# Patient Record
Sex: MALE | Race: OTHER | HISPANIC OR LATINO
[De-identification: names, ages, dates, MRNs, and addresses within clinical notes are randomized per-mention and may not be internally consistent; named-entity substitution may affect disease eponyms.]

---

## 2017-10-13 ENCOUNTER — APPOINTMENT (OUTPATIENT)
Dept: UROLOGY | Facility: CLINIC | Age: 57
End: 2017-10-13
Payer: COMMERCIAL

## 2017-10-13 VITALS
DIASTOLIC BLOOD PRESSURE: 95 MMHG | HEART RATE: 60 BPM | SYSTOLIC BLOOD PRESSURE: 148 MMHG | TEMPERATURE: 98.5 F | HEIGHT: 73 IN | WEIGHT: 205 LBS | BODY MASS INDEX: 27.17 KG/M2

## 2017-10-13 DIAGNOSIS — Z87.19 PERSONAL HISTORY OF OTHER DISEASES OF THE DIGESTIVE SYSTEM: ICD-10-CM

## 2017-10-13 DIAGNOSIS — Z85.528 PERSONAL HISTORY OF OTHER MALIGNANT NEOPLASM OF KIDNEY: ICD-10-CM

## 2017-10-13 DIAGNOSIS — Z80.42 FAMILY HISTORY OF MALIGNANT NEOPLASM OF PROSTATE: ICD-10-CM

## 2017-10-13 DIAGNOSIS — Z80.0 FAMILY HISTORY OF MALIGNANT NEOPLASM OF DIGESTIVE ORGANS: ICD-10-CM

## 2017-10-13 PROCEDURE — 99204 OFFICE O/P NEW MOD 45 MIN: CPT

## 2017-10-13 RX ORDER — FLUTICASONE PROPIONATE 50 UG/1
SPRAY, METERED NASAL
Refills: 0 | Status: ACTIVE | COMMUNITY

## 2017-10-16 LAB
ANION GAP SERPL CALC-SCNC: 16 MMOL/L
BUN SERPL-MCNC: 16 MG/DL
CALCIUM SERPL-MCNC: 9.6 MG/DL
CHLORIDE SERPL-SCNC: 102 MMOL/L
CO2 SERPL-SCNC: 25 MMOL/L
CREAT SERPL-MCNC: 1.14 MG/DL
GLUCOSE SERPL-MCNC: 97 MG/DL
POTASSIUM SERPL-SCNC: 4.5 MMOL/L
PSA SERPL-MCNC: 1.35 NG/ML
SODIUM SERPL-SCNC: 143 MMOL/L

## 2017-10-22 ENCOUNTER — FORM ENCOUNTER (OUTPATIENT)
Age: 57
End: 2017-10-22

## 2017-10-23 ENCOUNTER — OUTPATIENT (OUTPATIENT)
Dept: OUTPATIENT SERVICES | Facility: HOSPITAL | Age: 57
LOS: 1 days | End: 2017-10-23
Payer: COMMERCIAL

## 2017-10-23 ENCOUNTER — APPOINTMENT (OUTPATIENT)
Dept: RADIOLOGY | Facility: IMAGING CENTER | Age: 57
End: 2017-10-23
Payer: COMMERCIAL

## 2017-10-23 ENCOUNTER — APPOINTMENT (OUTPATIENT)
Dept: CT IMAGING | Facility: IMAGING CENTER | Age: 57
End: 2017-10-23
Payer: COMMERCIAL

## 2017-10-23 DIAGNOSIS — Z85.528 PERSONAL HISTORY OF OTHER MALIGNANT NEOPLASM OF KIDNEY: ICD-10-CM

## 2017-10-23 PROCEDURE — 74177 CT ABD & PELVIS W/CONTRAST: CPT | Mod: 26

## 2017-10-23 PROCEDURE — 71020: CPT | Mod: 26

## 2017-10-23 PROCEDURE — 74177 CT ABD & PELVIS W/CONTRAST: CPT

## 2017-10-23 PROCEDURE — 71046 X-RAY EXAM CHEST 2 VIEWS: CPT

## 2017-10-23 PROCEDURE — 82565 ASSAY OF CREATININE: CPT

## 2017-10-31 DIAGNOSIS — Z85.528 PERSONAL HISTORY OF OTHER MALIGNANT NEOPLASM OF KIDNEY: ICD-10-CM

## 2017-10-31 DIAGNOSIS — N20.0 CALCULUS OF KIDNEY: ICD-10-CM

## 2017-11-08 DIAGNOSIS — N20.0 CALCULUS OF KIDNEY: ICD-10-CM

## 2018-06-19 ENCOUNTER — FORM ENCOUNTER (OUTPATIENT)
Age: 58
End: 2018-06-19

## 2018-06-20 ENCOUNTER — APPOINTMENT (OUTPATIENT)
Dept: ULTRASOUND IMAGING | Facility: IMAGING CENTER | Age: 58
End: 2018-06-20
Payer: COMMERCIAL

## 2018-06-20 ENCOUNTER — OUTPATIENT (OUTPATIENT)
Dept: OUTPATIENT SERVICES | Facility: HOSPITAL | Age: 58
LOS: 1 days | End: 2018-06-20
Payer: COMMERCIAL

## 2018-06-20 DIAGNOSIS — Z85.528 PERSONAL HISTORY OF OTHER MALIGNANT NEOPLASM OF KIDNEY: ICD-10-CM

## 2018-06-20 PROCEDURE — 76770 US EXAM ABDO BACK WALL COMP: CPT

## 2018-06-20 PROCEDURE — 76770 US EXAM ABDO BACK WALL COMP: CPT | Mod: 26

## 2018-07-25 PROBLEM — Z80.0 FAMILY HISTORY OF COLON CANCER: Status: ACTIVE | Noted: 2017-10-13

## 2018-09-18 ENCOUNTER — APPOINTMENT (OUTPATIENT)
Dept: UROLOGY | Facility: CLINIC | Age: 58
End: 2018-09-18
Payer: COMMERCIAL

## 2018-09-18 DIAGNOSIS — Z12.5 ENCOUNTER FOR SCREENING FOR MALIGNANT NEOPLASM OF PROSTATE: ICD-10-CM

## 2018-09-18 PROCEDURE — 99214 OFFICE O/P EST MOD 30 MIN: CPT

## 2018-09-20 ENCOUNTER — OTHER (OUTPATIENT)
Age: 58
End: 2018-09-20

## 2018-09-21 LAB — PSA SERPL-MCNC: 1.54 NG/ML

## 2018-10-08 ENCOUNTER — FORM ENCOUNTER (OUTPATIENT)
Age: 58
End: 2018-10-08

## 2018-10-09 ENCOUNTER — OUTPATIENT (OUTPATIENT)
Dept: OUTPATIENT SERVICES | Facility: HOSPITAL | Age: 58
LOS: 1 days | End: 2018-10-09
Payer: COMMERCIAL

## 2018-10-09 ENCOUNTER — APPOINTMENT (OUTPATIENT)
Dept: MRI IMAGING | Facility: IMAGING CENTER | Age: 58
End: 2018-10-09
Payer: COMMERCIAL

## 2018-10-09 DIAGNOSIS — Z12.5 ENCOUNTER FOR SCREENING FOR MALIGNANT NEOPLASM OF PROSTATE: ICD-10-CM

## 2018-10-09 PROCEDURE — 82565 ASSAY OF CREATININE: CPT

## 2018-10-09 PROCEDURE — A9585: CPT

## 2018-10-09 PROCEDURE — 74183 MRI ABD W/O CNTR FLWD CNTR: CPT | Mod: 26

## 2018-10-09 PROCEDURE — 74183 MRI ABD W/O CNTR FLWD CNTR: CPT

## 2019-01-15 ENCOUNTER — OUTPATIENT (OUTPATIENT)
Dept: OUTPATIENT SERVICES | Facility: HOSPITAL | Age: 59
LOS: 1 days | End: 2019-01-15
Payer: SELF-PAY

## 2019-01-15 ENCOUNTER — APPOINTMENT (OUTPATIENT)
Dept: UROLOGY | Facility: CLINIC | Age: 59
End: 2019-01-15

## 2019-01-15 ENCOUNTER — APPOINTMENT (OUTPATIENT)
Dept: UROLOGY | Facility: CLINIC | Age: 59
End: 2019-01-15
Payer: COMMERCIAL

## 2019-01-15 DIAGNOSIS — R35.0 FREQUENCY OF MICTURITION: ICD-10-CM

## 2019-01-15 PROCEDURE — 76775 US EXAM ABDO BACK WALL LIM: CPT

## 2019-01-15 PROCEDURE — 76775 US EXAM ABDO BACK WALL LIM: CPT | Mod: 26

## 2019-01-16 DIAGNOSIS — N20.0 CALCULUS OF KIDNEY: ICD-10-CM

## 2019-01-16 DIAGNOSIS — Z85.528 PERSONAL HISTORY OF OTHER MALIGNANT NEOPLASM OF KIDNEY: ICD-10-CM

## 2019-07-02 ENCOUNTER — APPOINTMENT (OUTPATIENT)
Dept: UROLOGY | Facility: CLINIC | Age: 59
End: 2019-07-02

## 2019-07-02 ENCOUNTER — APPOINTMENT (OUTPATIENT)
Dept: UROLOGY | Facility: CLINIC | Age: 59
End: 2019-07-02
Payer: COMMERCIAL

## 2019-07-02 VITALS — TEMPERATURE: 98.2 F | SYSTOLIC BLOOD PRESSURE: 146 MMHG | DIASTOLIC BLOOD PRESSURE: 93 MMHG | HEART RATE: 54 BPM

## 2019-07-02 PROCEDURE — 99214 OFFICE O/P EST MOD 30 MIN: CPT

## 2019-07-02 NOTE — ASSESSMENT
[FreeTextEntry1] : Stone:  stable; observe; discussed AS vs. treatment/procedure\par Adrenal: MRI \par RCC history: MRI \par CAP screening/family history: patient refused LOKESH today; PSA today \par Follow up in one year

## 2019-07-02 NOTE — HISTORY OF PRESENT ILLNESS
[FreeTextEntry1] : CC: History of RCC, nephrolithiasis, adrenal lesion, PSA screening with positive family history \par \par Patient underwent partial nephrectomy for RCC. \par Low grade RCC with papillary features, 2008, G2, T1a 2.5 cm with negative margins. \par Patient with adrenal incidentaloma last imaged one year ago stable appearing. \par Positive family history of prostate cancer, PSA 1.54 in 2018\par US 2019: 10 mm right lower pole stone\par US : 10 mm right lower pole stone\par MRI 10/2018:  1 cm adrenal adenoma, no RCC recurrence, 1 cm stone \par CT 2017 stone was 8 mm\par \par FAMHX: Father, CAP ( of metastatic disease/CAP, at age 86)\par SURG: LPN \par SOCIAL: nonsmoker \par ROS: "aches and pains", otherwise negative 10 system review

## 2019-07-03 LAB — PSA SERPL-MCNC: 1.35 NG/ML

## 2020-12-22 ENCOUNTER — OFFICE VISIT (OUTPATIENT)
Dept: GASTROENTEROLOGY | Facility: CLINIC | Age: 60
End: 2020-12-22
Payer: COMMERCIAL

## 2020-12-22 VITALS
BODY MASS INDEX: 29.58 KG/M2 | DIASTOLIC BLOOD PRESSURE: 98 MMHG | WEIGHT: 223.2 LBS | HEART RATE: 76 BPM | SYSTOLIC BLOOD PRESSURE: 140 MMHG | HEIGHT: 73 IN

## 2020-12-22 DIAGNOSIS — Z86.010 HISTORY OF COLONIC POLYPS: ICD-10-CM

## 2020-12-22 DIAGNOSIS — K21.9 GASTROESOPHAGEAL REFLUX DISEASE WITHOUT ESOPHAGITIS: Primary | ICD-10-CM

## 2020-12-22 PROCEDURE — 99214 OFFICE O/P EST MOD 30 MIN: CPT | Performed by: INTERNAL MEDICINE

## 2020-12-22 RX ORDER — FLUTICASONE PROPIONATE 50 MCG
2 SPRAY, SUSPENSION (ML) NASAL DAILY
COMMUNITY

## 2020-12-22 RX ORDER — LORATADINE 10 MG/1
10 TABLET ORAL DAILY
COMMUNITY
End: 2022-04-28 | Stop reason: SDUPTHER

## 2020-12-22 RX ORDER — OMEPRAZOLE 40 MG/1
40 CAPSULE, DELAYED RELEASE ORAL DAILY
Qty: 30 CAPSULE | Refills: 11 | Status: SHIPPED | OUTPATIENT
Start: 2020-12-22 | End: 2021-01-13 | Stop reason: HOSPADM

## 2020-12-22 RX ORDER — ALBUTEROL SULFATE 90 UG/1
2 AEROSOL, METERED RESPIRATORY (INHALATION) EVERY 6 HOURS PRN
COMMUNITY
End: 2022-04-28 | Stop reason: SDUPTHER

## 2020-12-22 RX ORDER — OMEPRAZOLE 40 MG/1
40 CAPSULE, DELAYED RELEASE ORAL DAILY
COMMUNITY
End: 2020-12-22 | Stop reason: SDUPTHER

## 2020-12-22 NOTE — H&P (VIEW-ONLY)
Consultation - [facility]  Santiago Tay 1960 62 y o  male     ASSESSMENT @ PLAN:    he is a 52-year-old male with chronic severe gastroesophageal reflux disease status post Nissen fundal plication years ago with recurrence of symptoms now on omeprazole 40 mg daily  He had a negative Brady's exam in [place] in 2016  In addition his mother had colon cancer and he had a large complex polyp removed in [place] in 2016     1 will do EGD and colonoscopy to investigate    2 he will continue on his omeprazole 40 mg daily    Chief Complaint:  History of colon polyps and family history of colon cancer and GERD    HPI:  He is a 52-year-old male with longstanding gastroesophageal reflux disease  He has a history of having his gallbladder out and having and Nissen fundoplication at the same time  For 9 years he did not need a medication and this worked very well  He then had a return of symptoms and is now PPI dependent again  He is on omeprazole 40 mg daily and if he takes it every day it does work  He has a fear of kidney disease so he has been taking it every day  He has solid-food dysphagia times heartburn regurgitation  He belches sometimes after eating and then his family goes down  He had endoscopic exam in June of 2016  He had gastritis and no Brady's esophagus  He had a colonoscopy in September of 2016 and had a large complex tubulovillous adenoma removed from the cecum with internal hemorrhoids  His mother did have colon cancer  He has no diarrhea constipation melena hematochezia    REVIEW OF SYSTEMS:     CONSTITUTIONAL: Denies any fever, chills, or rigors  Good appetite, and no recent weight loss  HEENT: No earache or tinnitus  Denies hearing loss or visual disturbances  CARDIOVASCULAR: No chest pain or palpitations  RESPIRATORY: Denies any cough, hemoptysis, shortness of breath or dyspnea on exertion  GASTROINTESTINAL: As noted in the History of Present Illness  GENITOURINARY: No problems with urination  Denies any hematuria or dysuria  NEUROLOGIC: No dizziness or vertigo, denies headaches  MUSCULOSKELETAL: Denies any muscle or joint pain  SKIN: Denies skin rashes or itching  ENDOCRINE: Denies excessive thirst  Denies intolerance to heat or cold  PSYCHOSOCIAL: Denies depression or anxiety  Denies any recent memory loss       Past Medical History:   Diagnosis Date    GERD (gastroesophageal reflux disease)     Renal cancer, right (HCC)       Past Surgical History:   Procedure Laterality Date    CHOLECYSTECTOMY      KIDNEY SURGERY      NISSEN FUNDOPLICATION       Social History     Socioeconomic History    Marital status: Unknown     Spouse name: Not on file    Number of children: Not on file    Years of education: Not on file    Highest education level: Not on file   Occupational History    Not on file   Social Needs    Financial resource strain: Not on file    Food insecurity     Worry: Not on file     Inability: Not on file    Transportation needs     Medical: Not on file     Non-medical: Not on file   Tobacco Use    Smoking status: Never Smoker    Smokeless tobacco: Never Used   Substance and Sexual Activity    Alcohol use: Yes     Comment: social    Drug use: Never    Sexual activity: Not on file   Lifestyle    Physical activity     Days per week: Not on file     Minutes per session: Not on file    Stress: Not on file   Relationships    Social connections     Talks on phone: Not on file     Gets together: Not on file     Attends Latter day service: Not on file     Active member of club or organization: Not on file     Attends meetings of clubs or organizations: Not on file     Relationship status: Not on file    Intimate partner violence     Fear of current or ex partner: Not on file     Emotionally abused: Not on file     Physically abused: Not on file     Forced sexual activity: Not on file   Other Topics Concern    Not on file   Social History Narrative    Not on file     Family History   Problem Relation Age of Onset    Colon cancer Mother     Prostate cancer Father      Patient has no known allergies  Current Outpatient Medications   Medication Sig Dispense Refill    albuterol (PROVENTIL HFA,VENTOLIN HFA) 90 mcg/act inhaler Inhale 2 puffs every 6 (six) hours as needed      fluticasone (FLONASE) 50 mcg/act nasal spray 2 sprays into each nostril daily      loratadine (CLARITIN) 10 mg tablet Take 10 mg by mouth daily      omeprazole (PriLOSEC) 40 MG capsule Take 1 capsule (40 mg total) by mouth daily 30 capsule 11     No current facility-administered medications for this visit  Blood pressure 140/98, pulse 76, height 6' 1" (1 854 m), weight 101 kg (223 lb 3 2 oz)  PHYSICAL EXAM:     General Appearance:   Alert, cooperative, no distress, appears stated age    HEENT:   Normocephalic, atraumatic, anicteric      Neck:  Supple, symmetrical, trachea midline, no adenopathy;    thyroid: no enlargement/tenderness/nodules; no carotid  bruit or JVD    Lungs:   Clear to auscultation bilaterally; no rales, rhonchi or wheezing; respirations unlabored    Heart[de-identified]   S1 and S2 normal; regular rate and rhythm; no murmur, rub, or gallop     Abdomen:   Soft, non-tender, non-distended; normal bowel sounds; no masses, no organomegaly    Genitalia:   Deferred    Rectal:   Deferred    Extremities:  No cyanosis, clubbing or edema    Pulses:  2+ and symmetric all extremities    Skin:  Skin color, texture, turgor normal, no rashes or lesions    Lymph nodes:  No palpable cervical, axillary or inguinal lymphadenopathy        No results found for: WBC, HGB, HCT, MCV, PLT  No results found for: GLUCOSE, CALCIUM, NA, K, CO2, CL, BUN, CREATININE  No results found for: ALT, AST, GGT, ALKPHOS, BILITOT  No results found for: INR, PROTIME

## 2021-01-12 ENCOUNTER — ANESTHESIA EVENT (OUTPATIENT)
Dept: GASTROENTEROLOGY | Facility: HOSPITAL | Age: 61
End: 2021-01-12

## 2021-01-13 ENCOUNTER — ANESTHESIA (OUTPATIENT)
Dept: GASTROENTEROLOGY | Facility: HOSPITAL | Age: 61
End: 2021-01-13

## 2021-01-13 ENCOUNTER — HOSPITAL ENCOUNTER (OUTPATIENT)
Dept: GASTROENTEROLOGY | Facility: HOSPITAL | Age: 61
Setting detail: OUTPATIENT SURGERY
Discharge: HOME | End: 2021-01-13
Attending: INTERNAL MEDICINE | Admitting: INTERNAL MEDICINE
Payer: COMMERCIAL

## 2021-01-13 VITALS
OXYGEN SATURATION: 96 % | WEIGHT: 219.8 LBS | BODY MASS INDEX: 29.13 KG/M2 | RESPIRATION RATE: 16 BRPM | DIASTOLIC BLOOD PRESSURE: 77 MMHG | TEMPERATURE: 97.8 F | HEART RATE: 61 BPM | SYSTOLIC BLOOD PRESSURE: 116 MMHG | HEIGHT: 73 IN

## 2021-01-13 VITALS — HEART RATE: 72 BPM

## 2021-01-13 DIAGNOSIS — K21.9 GASTROESOPHAGEAL REFLUX DISEASE WITHOUT ESOPHAGITIS: ICD-10-CM

## 2021-01-13 DIAGNOSIS — Z86.010 HISTORY OF COLONIC POLYPS: ICD-10-CM

## 2021-01-13 PROBLEM — N28.9 KIDNEY DISEASE: Status: ACTIVE | Noted: 2021-01-13

## 2021-01-13 PROBLEM — N28.9 KIDNEY DISEASE: Status: RESOLVED | Noted: 2021-01-13 | Resolved: 2021-01-13

## 2021-01-13 PROCEDURE — 43239 EGD BIOPSY SINGLE/MULTIPLE: CPT | Performed by: INTERNAL MEDICINE

## 2021-01-13 PROCEDURE — 45381 COLONOSCOPY SUBMUCOUS NJX: CPT | Performed by: INTERNAL MEDICINE

## 2021-01-13 PROCEDURE — 88305 TISSUE EXAM BY PATHOLOGIST: CPT | Performed by: PATHOLOGY

## 2021-01-13 PROCEDURE — 45385 COLONOSCOPY W/LESION REMOVAL: CPT | Performed by: INTERNAL MEDICINE

## 2021-01-13 RX ORDER — PANTOPRAZOLE SODIUM 40 MG/1
40 TABLET, DELAYED RELEASE ORAL DAILY
Qty: 30 TABLET | Refills: 11 | Status: SHIPPED | OUTPATIENT
Start: 2021-01-13 | End: 2022-01-17

## 2021-01-13 RX ORDER — LIDOCAINE HYDROCHLORIDE 20 MG/ML
INJECTION, SOLUTION EPIDURAL; INFILTRATION; INTRACAUDAL; PERINEURAL AS NEEDED
Status: DISCONTINUED | OUTPATIENT
Start: 2021-01-13 | End: 2021-01-13

## 2021-01-13 RX ORDER — PROPOFOL 10 MG/ML
INJECTION, EMULSION INTRAVENOUS AS NEEDED
Status: DISCONTINUED | OUTPATIENT
Start: 2021-01-13 | End: 2021-01-13

## 2021-01-13 RX ORDER — SODIUM CHLORIDE, SODIUM LACTATE, POTASSIUM CHLORIDE, CALCIUM CHLORIDE 600; 310; 30; 20 MG/100ML; MG/100ML; MG/100ML; MG/100ML
125 INJECTION, SOLUTION INTRAVENOUS CONTINUOUS
Status: DISCONTINUED | OUTPATIENT
Start: 2021-01-13 | End: 2021-01-17 | Stop reason: HOSPADM

## 2021-01-13 RX ADMIN — SODIUM CHLORIDE, SODIUM LACTATE, POTASSIUM CHLORIDE, AND CALCIUM CHLORIDE 125 ML/HR: .6; .31; .03; .02 INJECTION, SOLUTION INTRAVENOUS at 09:30

## 2021-01-13 RX ADMIN — PROPOFOL 40 MG: 10 INJECTION, EMULSION INTRAVENOUS at 10:11

## 2021-01-13 RX ADMIN — PROPOFOL 30 MG: 10 INJECTION, EMULSION INTRAVENOUS at 10:07

## 2021-01-13 RX ADMIN — PROPOFOL 30 MG: 10 INJECTION, EMULSION INTRAVENOUS at 10:05

## 2021-01-13 RX ADMIN — PROPOFOL 40 MG: 10 INJECTION, EMULSION INTRAVENOUS at 10:16

## 2021-01-13 RX ADMIN — PROPOFOL 30 MG: 10 INJECTION, EMULSION INTRAVENOUS at 10:13

## 2021-01-13 RX ADMIN — PROPOFOL 40 MG: 10 INJECTION, EMULSION INTRAVENOUS at 10:20

## 2021-01-13 RX ADMIN — PROPOFOL 40 MG: 10 INJECTION, EMULSION INTRAVENOUS at 10:26

## 2021-01-13 RX ADMIN — PROPOFOL 40 MG: 10 INJECTION, EMULSION INTRAVENOUS at 10:23

## 2021-01-13 RX ADMIN — LIDOCAINE HYDROCHLORIDE 100 MG: 20 INJECTION, SOLUTION EPIDURAL; INFILTRATION; INTRACAUDAL; PERINEURAL at 10:03

## 2021-01-13 RX ADMIN — PROPOFOL 40 MG: 10 INJECTION, EMULSION INTRAVENOUS at 10:14

## 2021-01-13 RX ADMIN — PROPOFOL 40 MG: 10 INJECTION, EMULSION INTRAVENOUS at 10:18

## 2021-01-13 RX ADMIN — PROPOFOL 40 MG: 10 INJECTION, EMULSION INTRAVENOUS at 10:09

## 2021-01-13 RX ADMIN — PROPOFOL 150 MG: 10 INJECTION, EMULSION INTRAVENOUS at 10:03

## 2021-01-13 NOTE — ANESTHESIA PREPROCEDURE EVALUATION
Procedure:  COLONOSCOPY  EGD    Relevant Problems   GI/HEPATIC   (+) Gastroesophageal reflux disease without esophagitis      /RENAL   (+) Kidney disease (H/O renal cell CA)      NEURO/PSYCH   (+) History of colonic polyps        Physical Exam    Airway    Mallampati score: III  TM Distance: >3 FB  Neck ROM: full     Dental       Cardiovascular  Rate: normal,     Pulmonary  Breath sounds clear to auscultation,     Other Findings        Anesthesia Plan  ASA Score- 2     Anesthesia Type- IV sedation with anesthesia with ASA Monitors  Additional Monitors:   Airway Plan:           Plan Factors-Exercise tolerance (METS): >4 METS  Chart reviewed  Existing labs reviewed  Patient summary reviewed  Induction- intravenous  Postoperative Plan-     Informed Consent- Anesthetic plan and risks discussed with patient  I personally reviewed this patient with the CRNA  Discussed and agreed on the Anesthesia Plan with the CRNA  London Whitfield

## 2021-01-13 NOTE — DISCHARGE INSTRUCTIONS
Colonoscopy   WHAT YOU NEED TO KNOW:   What do I need to know about a colonoscopy? A colonoscopy is a procedure to examine the inside of your colon (intestine) with a scope  A scope is a flexible tube with a small light and camera on the end  Polyps or tissue growths may be removed during your colonoscopy  How should I prepare the week before my colonoscopy? You will need to stop taking medicines that contain aspirin or iron for 7 days before your colonoscopy  If you take anticoagulants, such as warfarin, ask when you should stop taking it  Make plans for someone to drive you home after your procedure  How should I prepare the day before my colonoscopy? Your healthcare provider will have you prepare your bowels before your procedure  Your bowels will need to be empty before your procedure to allow him to clearly see your colon  You will need to do the following the day before your procedure:  · Have only clear liquids  for the entire day before your colonoscopy  Clear liquid diet includes clear fruit juices and broths, clear flavored gelatin, and hard candy  It also includes coffee, tea, carbonated beverages, and clear sports drinks  · Follow your bowel prep as directed  There are many different preparations that can be given before a colonoscopy  Some are given over 2 hours and others over 6 hours  Some are given earlier in the afternoon the day before the colonoscopy  Others are given the day before and then the morning of the colonoscopy  With any bowel prep, stay close to the bathroom  This liquid will cause your bowels to move frequently  · An enema  may be needed  Your healthcare provider may tell you to use an enema to help clean out your bowels  · Do not eat or drink anything after midnight  This will help prevent problems that can happen if you vomit while under anesthesia  What will happen during my colonoscopy? · You will be given medicine to help you relax   You will lie on your left side and raise one or both knees toward your chest  Your healthcare provider will examine your anus and use a finger to check your rectum  You may need another enema if your bowel is not empty  The scope will be lubricated and gently placed into your anus  It will then be passed through your rectum and into your colon  Water or air will be put into your colon to help clean or expand it  This is done so your healthcare provider can see your colon clearly  · Tissue samples may be taken from the walls of your bowel and sent to a lab for tests  If you have a polyp, your healthcare provider will pass a wire loop through the scope and use it to hold the polyp  The polyp is then burned or cut off the wall of your colon  Removed polyps are sent to a lab for tests  Pictures of your colon may be taken during the procedure  The scope will be removed when the procedure is done  What will happen after my colonoscopy? · Rest after your procedure  You may feel bloated, have some gas and abdominal discomfort  You may need to lie on your right side with a heating pad on your abdomen  You may need to take short walks to help move the gas out  Eat small meals, if you feel bloated  Do not drive or make important decisions until the day after your procedure  · You may have polyps removed  Do not take aspirin or go on long car trips for 7 days after your procedure  Ask your healthcare provider about any other limits after your procedure  What are the risks of a colonoscopy? You may have pain or bleeding after the scope or polyps are removed  You may also have a slow heartbeat, decreased blood pressure, or increased sweating  Your colon may tear due to the increased pressure from the scope and other instruments  This may cause bowel contents to leak out of your colon and into your abdomen  If this happens, you will need to stay in the hospital and have surgery on your colon     CARE AGREEMENT:   You have the right to help plan your care  Learn about your health condition and how it may be treated  Discuss treatment options with your caregivers to decide what care you want to receive  You always have the right to refuse treatment  The above information is an  only  It is not intended as medical advice for individual conditions or treatments  Talk to your doctor, nurse or pharmacist before following any medical regimen to see if it is safe and effective for you  © 2017 [address] is for End User's use only and may not be sold, redistributed or otherwise used for commercial purposes  All illustrations and images included in CareNotes® are the copyrighted property of A D A M , Inc  or [employer]  Upper Endoscopy   WHAT YOU NEED TO KNOW:   An upper endoscopy is also called an upper gastrointestinal (GI) endoscopy, or an esophagogastroduodenoscopy (EGD)  You may feel bloated, gassy, or have some abdominal discomfort after your procedure  Your throat may be sore for 24 to 36 hours  You may burp or pass gas from air that is still inside your body  DISCHARGE INSTRUCTIONS:   Call 911 if:   · You have sudden chest pain or trouble breathing  Seek care immediately if:   · You feel dizzy or faint  · You have trouble swallowing  · You have severe throat pain  · Your bowel movements are very dark or black  · Your abdomen is hard and firm and you have severe pain  · You vomit blood  Contact your healthcare provider if:   · You feel full or bloated and cannot burp or pass gas  · You have not had a bowel movement for 3 days after your procedure  · You have neck pain  · You have a fever or chills  · You have nausea or are vomiting  · You have a rash or hives  · You have questions or concerns about your endoscopy  Relieve a sore throat:  Suck on throat lozenges or crushed ice  Gargle with a small amount of warm salt water   Mix 1 teaspoon of salt and 1 cup of warm water to make salt water  Relieve gas and discomfort from bloating:  Lie on your right side with a heating pad on your abdomen  Take short walks to help pass gas  Eat small meals until bloating is relieved  Rest after your procedure:  Do not drive or make important decisions until the day after your procedure  Return to your normal activity as directed  You can usually return to work the day after your procedure  Follow up with your healthcare provider as directed:  Write down your questions so you remember to ask them during your visits  © Copyright [address] Information is for End User's use only and may not be sold, redistributed or otherwise used for commercial purposes  All illustrations and images included in CareNotes® are the copyrighted property of A D A [employer]  or [facility] Alex Olsen   The above information is an  only  It is not intended as medical advice for individual conditions or treatments  Talk to your doctor, nurse or pharmacist before following any medical regimen to see if it is safe and effective for you

## 2021-01-13 NOTE — ANESTHESIA POSTPROCEDURE EVALUATION
Post-Op Assessment Note    CV Status:  Stable  Pain Score: 0    Pain management: adequate     Mental Status:  Sleepy   Hydration Status:  Euvolemic   PONV Controlled:  Controlled   Airway Patency:  Patent      Post Op Vitals Reviewed: Yes      Staff: CRNA         No complications documented      BP   106//68   Temp      Pulse  71   Resp  12   SpO2   96%

## 2021-02-08 ENCOUNTER — OFFICE VISIT (OUTPATIENT)
Dept: INTERNAL MEDICINE CLINIC | Facility: CLINIC | Age: 61
End: 2021-02-08
Payer: COMMERCIAL

## 2021-02-08 VITALS
HEIGHT: 73 IN | RESPIRATION RATE: 16 BRPM | DIASTOLIC BLOOD PRESSURE: 94 MMHG | OXYGEN SATURATION: 99 % | SYSTOLIC BLOOD PRESSURE: 140 MMHG | HEART RATE: 74 BPM | TEMPERATURE: 98 F | BODY MASS INDEX: 28.89 KG/M2 | WEIGHT: 218 LBS

## 2021-02-08 DIAGNOSIS — K21.00 GASTROESOPHAGEAL REFLUX DISEASE WITH ESOPHAGITIS WITHOUT HEMORRHAGE: ICD-10-CM

## 2021-02-08 DIAGNOSIS — Z80.42 FAMILY HISTORY OF PROSTATE CANCER IN FATHER: Primary | ICD-10-CM

## 2021-02-08 DIAGNOSIS — R03.0 ELEVATED BLOOD PRESSURE READING WITHOUT DIAGNOSIS OF HYPERTENSION: ICD-10-CM

## 2021-02-08 DIAGNOSIS — Z11.4 ENCOUNTER FOR SCREENING FOR HUMAN IMMUNODEFICIENCY VIRUS (HIV): ICD-10-CM

## 2021-02-08 DIAGNOSIS — Z13.6 ENCOUNTER FOR SCREENING FOR CARDIOVASCULAR DISORDERS: ICD-10-CM

## 2021-02-08 LAB — EXTERNAL HIV SCREEN: NORMAL

## 2021-02-08 PROCEDURE — 99203 OFFICE O/P NEW LOW 30 MIN: CPT | Performed by: INTERNAL MEDICINE

## 2021-02-08 RX ORDER — AZELASTINE 1 MG/ML
1 SPRAY, METERED NASAL 2 TIMES DAILY
COMMUNITY

## 2021-02-08 RX ORDER — BUDESONIDE AND FORMOTEROL FUMARATE DIHYDRATE 160; 4.5 UG/1; UG/1
2 AEROSOL RESPIRATORY (INHALATION) 2 TIMES DAILY
COMMUNITY
End: 2022-04-28 | Stop reason: SDUPTHER

## 2021-02-08 RX ORDER — DIPHENHYD/PHENYLEPH/ACETAMINOP 12.5-5-325
TABLET ORAL 2 TIMES DAILY
Qty: 1 EACH | Refills: 2 | Status: SHIPPED | OUTPATIENT
Start: 2021-02-08 | End: 2021-02-08

## 2021-02-08 RX ORDER — BLOOD PRESSURE TEST KIT-LARGE
KIT MISCELLANEOUS
Qty: 1 DEVICE | Refills: 0 | Status: SHIPPED | OUTPATIENT
Start: 2021-02-08

## 2021-02-08 NOTE — PROGRESS NOTES
Assessment/Plan:    1  Preventative screenings: Repeat colonoscopy in 3 months- already scheduled; EGD in 3 years for f/u rebolledo's esophagus- continue Protonix daily  2  Rebolledo's esophagus- good f/u with GI; continue protonix;   3  H/o elevated blood pressure reading outside of office- obtain omron blood pressure cuff; measure bp twice daily and keep log, call me in 1 week with results  4  H/o prostate cancer in father- order psa screen  5  Screening for HIV- check hiv ag  6  Screening for Hep C- check for ab  7  H/o renal cancer s/p right partial nephrectomy- good f/u with urology--> appointment on 2/18  No problem-specific Assessment & Plan notes found for this encounter  Diagnoses and all orders for this visit:    Family history of prostate cancer in father  -     PSA, total and free; Future    Elevated blood pressure reading without diagnosis of hypertension  -     Discontinue: Blood Pressure Monitoring (Blood Pressure Kit) KIT; Use 2 (two) times a day    Gastroesophageal reflux disease with esophagitis without hemorrhage  -     CBC and differential; Future  -     Basic metabolic panel; Future    Encounter for screening for cardiovascular disorders  -     Lipid panel; Future    Encounter for screening for human immunodeficiency virus (HIV)  -     HIV 1/2 Antigen/Antibody (4th Generation) w Reflex [facility]; Future    Other orders  -     budesonide-formoterol (SYMBICORT) 160-4 5 mcg/act inhaler; Inhale 2 puffs 2 (two) times a day Rinse mouth after use  -     azelastine (ASTELIN) 0 1 % nasal spray; 1 spray into each nostril 2 (two) times a day Use in each nostril as directed          Subjective:      Patient ID: Lina Rouse is a 62 y o  male  Patient is a pleasant 64-year-old male who presents to the clinic today to establish care as a new patient  He is a  who is currently retired   He a significant PMH for child huang asthma, chronic severe GERD s/p Nissen fundal plication years ago with recurrence of symptoms, now on protonix for rebolledo's esophagus  He also has a h/o large complex polyp removed in [place] in 2016  Plans are for repeat testing  H/O right renal cancer with right partial nephrectomy  He is concerned about his blood pressure  He states he has had his blood pressure read a few times and noted readings of 140/80  Denies any cp, sob, palpitations, fever, chills, abdominal pain, bloody stools  Notes h/o cardiac cath in 5/2016 which was normal               The following portions of the patient's history were reviewed and updated as appropriate:   He  has a past medical history of Asthma, Colon polyp, GERD (gastroesophageal reflux disease), and Renal cancer, right ([address] )  He   Patient Active Problem List    Diagnosis Date Noted    Kidney disease 01/13/2021    Gastroesophageal reflux disease without esophagitis 12/22/2020    History of colonic polyps 12/22/2020     He  has a past surgical history that includes Cholecystectomy; Kidney surgery; and Nissen fundoplication  His family history includes Colon cancer in his mother; Prostate cancer in his father  He  reports that he has never smoked  He has never used smokeless tobacco  He reports current alcohol use  He reports that he does not use drugs  Current Outpatient Medications   Medication Sig Dispense Refill    albuterol (PROVENTIL HFA,VENTOLIN HFA) 90 mcg/act inhaler Inhale 2 puffs every 6 (six) hours as needed      azelastine (ASTELIN) 0 1 % nasal spray 1 spray into each nostril 2 (two) times a day Use in each nostril as directed      budesonide-formoterol (SYMBICORT) 160-4 5 mcg/act inhaler Inhale 2 puffs 2 (two) times a day Rinse mouth after use        fluticasone (FLONASE) 50 mcg/act nasal spray 2 sprays into each nostril daily      loratadine (CLARITIN) 10 mg tablet Take 10 mg by mouth daily      Blood Pressure Monitoring (Blood Pressure Monitor 3) JEANNINE USE 2 (TWO) TIMES A DAY 1 Device 0    pantoprazole (PROTONIX) 40 mg tablet Take 1 tablet (40 mg total) by mouth daily (Patient not taking: Reported on 2/8/2021) 30 tablet 11     No current facility-administered medications for this visit  Current Outpatient Medications on File Prior to Visit   Medication Sig    albuterol (PROVENTIL HFA,VENTOLIN HFA) 90 mcg/act inhaler Inhale 2 puffs every 6 (six) hours as needed    azelastine (ASTELIN) 0 1 % nasal spray 1 spray into each nostril 2 (two) times a day Use in each nostril as directed    budesonide-formoterol (SYMBICORT) 160-4 5 mcg/act inhaler Inhale 2 puffs 2 (two) times a day Rinse mouth after use   fluticasone (FLONASE) 50 mcg/act nasal spray 2 sprays into each nostril daily    loratadine (CLARITIN) 10 mg tablet Take 10 mg by mouth daily    pantoprazole (PROTONIX) 40 mg tablet Take 1 tablet (40 mg total) by mouth daily (Patient not taking: Reported on 2/8/2021)     No current facility-administered medications on file prior to visit  He has No Known Allergies       Review of Systems   HENT: Negative for postnasal drip  Respiratory: Negative for cough and shortness of breath  Gastrointestinal: Negative for abdominal pain and blood in stool  All other systems reviewed and are negative  Objective:      /94 (BP Location: Right arm, Patient Position: Sitting, Cuff Size: Standard)   Pulse 74   Temp 98 °F (36 7 °C) (Tympanic)   Resp 16   Ht 6' 1" (1 854 m)   Wt 98 9 kg (218 lb)   SpO2 99%   BMI 28 76 kg/m²          Physical Exam  Vitals signs and nursing note reviewed  Constitutional:       Appearance: Normal appearance  HENT:      Head: Normocephalic and atraumatic  Right Ear: Tympanic membrane normal       Left Ear: Tympanic membrane normal       Nose: Nose normal       Mouth/Throat:      Mouth: Mucous membranes are moist    Eyes:      Pupils: Pupils are equal, round, and reactive to light  Neck:      Musculoskeletal: Normal range of motion  Vascular: No carotid bruit  Cardiovascular:      Rate and Rhythm: Normal rate and regular rhythm  Heart sounds: Normal heart sounds  No murmur  Pulmonary:      Effort: Pulmonary effort is normal       Breath sounds: Normal breath sounds  Abdominal:      General: Abdomen is flat  Bowel sounds are normal       Palpations: Abdomen is soft  Musculoskeletal: Normal range of motion  Lymphadenopathy:      Cervical: No cervical adenopathy  Skin:     General: Skin is warm  Neurological:      General: No focal deficit present  Mental Status: He is alert and oriented to person, place, and time     Psychiatric:         Mood and Affect: Mood normal          Behavior: Behavior normal

## 2021-02-15 ENCOUNTER — TELEPHONE (OUTPATIENT)
Dept: INTERNAL MEDICINE CLINIC | Facility: CLINIC | Age: 61
End: 2021-02-15

## 2021-02-15 NOTE — TELEPHONE ENCOUNTER
Patient called in reference to his labs  Would like a call when lab test results are in      Call back #[phone]

## 2021-02-18 ENCOUNTER — TELEMEDICINE (OUTPATIENT)
Dept: UROLOGY | Facility: CLINIC | Age: 61
End: 2021-02-18
Payer: COMMERCIAL

## 2021-02-18 DIAGNOSIS — C64.1 RENAL CELL CARCINOMA OF RIGHT KIDNEY (HCC): Primary | ICD-10-CM

## 2021-02-18 DIAGNOSIS — D35.01 ADRENAL ADENOMA, RIGHT: ICD-10-CM

## 2021-02-18 DIAGNOSIS — Z12.5 SCREENING FOR PROSTATE CANCER: ICD-10-CM

## 2021-02-18 DIAGNOSIS — K86.2 PANCREATIC CYST: ICD-10-CM

## 2021-02-18 DIAGNOSIS — Z80.0 FAMILY HISTORY OF PANCREATIC CANCER: ICD-10-CM

## 2021-02-18 PROCEDURE — 99245 OFF/OP CONSLTJ NEW/EST HI 55: CPT | Performed by: UROLOGY

## 2021-02-18 NOTE — PROGRESS NOTES
Virtual Regular Visit-It was my intent to perform this visit via video technology but the patient was not able to do a video connection so the visit was completed via audio telephone only  Assessment/Plan:    Problem List Items Addressed This Visit     None      Visit Diagnoses     Renal cell carcinoma of right kidney ([address] )    -  Primary               Reason for visit is No chief complaint on file  Encounter provider Nilsa Arenas MD    Provider located at [address] [facility] [address]  [address] [facility] [address] [phone]      Recent Visits  No visits were found meeting these conditions  Showing recent visits within past 7 days and meeting all other requirements     Today's Visits  Date Type Provider Dept   02/18/21 Telemedicine Nilsa Arenas MD [facility]   Showing today's visits and meeting all other requirements     Future Appointments  No visits were found meeting these conditions  Showing future appointments within next 150 days and meeting all other requirements        The patient was identified by name and date of birth  Lina Rouse was informed that this is a telemedicine visit and that the visit is being conducted through [facility] and patient was informed that this is a secure, HIPAA-compliant platform  He agrees to proceed     My office door was closed  No one else was in the room  He acknowledged consent and understanding of privacy and security of the video platform  The patient has agreed to participate and understands they can discontinue the visit at any time  Patient was contacted via [employer] video but was unable to navigate the technology and so video visit was converted to virtual telephone  Patient is aware this is a billable service  Subjective  Lina Rouse is a 62 y o  male with a history of laparopscopic RIGHT partial nephrectomy in 2008 in [address] [place] [address] for 2 5cm papillary RCC, low grade  Patient had regular imaging surveillance, last in 2017  There is no recurrence or concerns for kidney cancer at that time  Patient did have a small right adrenal adenoma which was fat containing and negative density on imaging  He also had a growing cystic lesion of the pancreas  He was told that this was not necessarily concerning but this was not followed up on  Patient does have a family history of pancreatic cancer in his brother who passed away  Patient is concerned about this area  He does note some abdominal pain when he lays on his right side  Past Medical History:   Diagnosis Date    Asthma     Colon polyp     GERD (gastroesophageal reflux disease)     Renal cancer, right (HCC)        Past Surgical History:   Procedure Laterality Date    CHOLECYSTECTOMY      KIDNEY SURGERY      NISSEN FUNDOPLICATION         Current Outpatient Medications   Medication Sig Dispense Refill    albuterol (PROVENTIL HFA,VENTOLIN HFA) 90 mcg/act inhaler Inhale 2 puffs every 6 (six) hours as needed      azelastine (ASTELIN) 0 1 % nasal spray 1 spray into each nostril 2 (two) times a day Use in each nostril as directed      Blood Pressure Monitoring (Blood Pressure Monitor 3) JEANNINE USE 2 (TWO) TIMES A DAY 1 Device 0    budesonide-formoterol (SYMBICORT) 160-4 5 mcg/act inhaler Inhale 2 puffs 2 (two) times a day Rinse mouth after use   fluticasone (FLONASE) 50 mcg/act nasal spray 2 sprays into each nostril daily      loratadine (CLARITIN) 10 mg tablet Take 10 mg by mouth daily      pantoprazole (PROTONIX) 40 mg tablet Take 1 tablet (40 mg total) by mouth daily (Patient not taking: Reported on 2/8/2021) 30 tablet 11     No current facility-administered medications for this visit  No Known Allergies    Review of Systems   Constitutional: Negative  Respiratory: Negative  Cardiovascular: Negative  Genitourinary: Negative  Musculoskeletal: Negative  Skin: Negative  Psychiatric/Behavioral: Negative  Video Exam-unable to obtain    There were no vitals filed for this visit  RECORDS:  OP note and pathology available in media tab  PLAN:  1  History of papillary RCC - patient had surgery for pT1a RCC in   Last imaging in 2017 negative for recurrence  Based on NCCN guidelines, no additional imaging surveillance required    2  Adrenal adenoma - no imaging followup needed    3  Pancreatic cyst-patient has concerns given brother  of pancreatic cancer  No followup from 2017 film  Will refer to surgical oncology    4  CaP Screening - PSA 1 4  BRIT within next 6 months  Yearly screening    I spent 40 minutes with patient today in which greater than 50% of the time was spent in counseling/coordination of care regarding history of RCC, adrenal adenoma, pancreatic cyst, family history, CaP Screening plan      VIRTUAL VISIT DISCLAIMER    Sara Maury acknowledges that he has consented to an online visit or consultation  He understands that the online visit is based solely on information provided by him, and that, in the absence of a face-to-face physical evaluation by the physician, the diagnosis he receives is both limited and provisional in terms of accuracy and completeness  This is not intended to replace a full medical face-to-face evaluation by the physician  Sara Mendiola understands and accepts these terms

## 2021-02-19 ENCOUNTER — TELEPHONE (OUTPATIENT)
Dept: HEMATOLOGY ONCOLOGY | Facility: CLINIC | Age: 61
End: 2021-02-19

## 2021-02-19 NOTE — TELEPHONE ENCOUNTER
New Patient GI Form   Patient Details: Maria Teresa Gaines  1960  [id number]     Background Information:  [address] starts by opening a telephone encounter and gathering the following information   Who is calling to schedule and relationship?  self   Referring Provider Dr Clair Horn   To which specialty is the referral?  Surgical Oncology   Reason for Visit? New Diagnosis   Tumor Type? Pancreatic Cyst / Family Hx of Pancreatic Ca   Is there a confirmed diagnosis from biopsy/tissue reviewed by Pathology?  no   Date of Tissue Diagnosis  (If done outside of [facility] please obtain report and slides)  (If no tissue diagnosis, please stop and discuss with Navigator prior to scheduling)  na   Is patient aware of the diagnosis? yes   Has Imaging been completed? yes   If YES, where was the imaging done? (If outside [place] [address] obtain records) Scans from [place] in [address] [place] [address]   Have any endoscopies been done (colonoscopy, EGD, EUS)  no   If YES where were they performed? (If outside of [facility] obtain the records)     Has blood work been done?  no   If YES, where was the blood work done? (If outside of [facility] obtain records)  na   Is there a personal history of cancer? (If YES please list type)  no   Is there a family history of cancer? (If YES please list type)  yes - pancreatic/brother; mom/colon; dad/prostate   Scheduling Information:   [facility]   Are there any days the patient cannot be seen? Miscellaneous: Patient will bring disk with imaging  After completing the above information, please route to finance, nurse navigation and clinical trials for review

## 2021-03-02 NOTE — PROGRESS NOTES
Patient called the office requesting lab results  Unfortunately he has not received the results from me as of yet  Labs revealing elevated total cholesterol; BMP revealing cr 1 3 which appears to be his baseline; hyperkalemia- 5 4  CBC is unremarkable  PSA 1 4  Since last visit, he has seen urology for continued surveillance for h/o right renal cell carcinoma (2008)  Per urology- Last imaging in 2017 negative for recurrence  Based on NCCN guidelines, no additional imaging surveillance required  She was also referred to surgical oncology for ? pancreatic cyst in s/o family h/o pancreatic cancer in his brother  Hem/onc referral ordered  Will call patient to discuss

## 2021-03-18 ENCOUNTER — TELEPHONE (OUTPATIENT)
Dept: SURGICAL ONCOLOGY | Facility: CLINIC | Age: 61
End: 2021-03-18

## 2021-03-18 ENCOUNTER — CONSULT (OUTPATIENT)
Dept: SURGICAL ONCOLOGY | Facility: CLINIC | Age: 61
End: 2021-03-18
Payer: COMMERCIAL

## 2021-03-18 VITALS
WEIGHT: 211 LBS | HEART RATE: 84 BPM | BODY MASS INDEX: 27.96 KG/M2 | TEMPERATURE: 98.2 F | DIASTOLIC BLOOD PRESSURE: 84 MMHG | SYSTOLIC BLOOD PRESSURE: 130 MMHG | RESPIRATION RATE: 16 BRPM | HEIGHT: 73 IN

## 2021-03-18 DIAGNOSIS — Z80.0 FAMILY HISTORY OF PANCREATIC CANCER: ICD-10-CM

## 2021-03-18 DIAGNOSIS — K86.2 PANCREATIC CYST: Primary | ICD-10-CM

## 2021-03-18 PROCEDURE — 99243 OFF/OP CNSLTJ NEW/EST LOW 30: CPT | Performed by: SURGERY

## 2021-03-18 NOTE — PROGRESS NOTES
Surgical Oncology Consult       [facility]  [facility] [place]  [address]  [place] [address]    Berry Timmons  1960  [id number]  [facility]  [facility] [place]  [address]  [place] [address]    Diagnoses and all orders for this visit:    Pancreatic cyst  -     Ambulatory referral to Surgical Oncology  -     MRI abdomen w wo contrast and mrcp; Future  -     BUN; Future  -     Creatinine, serum; Future    Family history of pancreatic cancer  -     Ambulatory referral to Surgical Oncology  -     Ambulatory Referral to Oncology Genetics; Future        Chief Complaint   Patient presents with    Consult       Return in about 2 weeks (around 2021) for Office Visit, Imaging - See orders  Oncology History    No history exists  History of Present Illness:   60-year-old male who was found to have a pancreas cyst on incidental imaging in 2017  This was seen on a CT which I personally reviewed  There is a report of a follow-up MRI in 2018 where the pancreas was unremarkable  He is currently feeling well  He denies any abdominal pain, nausea or vomiting  He was recently seen by Urology because of a history of renal cell carcinoma  He has a significant family history of a brother who  of metastatic pancreas cancer at age 79  There is also family history colon cancer and bladder cancer  No previous history of pancreatitis  Review of Systems  Complete ROS Surg Onc:   Constitutional: The patient denies new or recent history of general fatigue, no recent weight loss, no change in appetite  Eyes: No complaints of visual problems, no scleral icterus  ENT: no complaints of ear pain, no hoarseness, no difficulty swallowing,  no tinnitus and no new masses in head, oral cavity, or neck  Cardiovascular: No complaints of chest pain, no palpitations, no ankle edema  Respiratory: No complaints of shortness of breath, no cough  Gastrointestinal: No complaints of jaundice, no bloody stools, no pale stools  Genitourinary: No complaints of dysuria, no hematuria, no nocturia, no frequent urination, no urethral discharge  Musculoskeletal: No complaints of weakness, paralysis, joint stiffness or arthralgias  Integumentary: No complaints of rash, no new lesions  Neurological: No complaints of convulsions, no seizures, no dizziness  Hematologic/Lymphatic: No complaints of easy bruising  Endocrine:  No hot or cold intolerance  No polydipsia, polyphagia, or polyuria  Allergy/immunology:  No environmental allergies  No food allergies  Not immunocompromised  Skin:  No pallor or rash  No wound            Patient Active Problem List   Diagnosis    Gastroesophageal reflux disease without esophagitis    History of colonic polyps    Kidney disease    Adrenal adenoma, right    Pancreatic cyst    Family history of pancreatic cancer     Past Medical History:   Diagnosis Date    Asthma     Colon polyp     GERD (gastroesophageal reflux disease)     Renal cancer, right ([address] )      Past Surgical History:   Procedure Laterality Date    CHOLECYSTECTOMY      KIDNEY SURGERY      NISSEN FUNDOPLICATION       Family History   Problem Relation Age of Onset    Colon cancer Mother     Prostate cancer Father      Social History     Socioeconomic History    Marital status: /Civil Union     Spouse name: Not on file    Number of children: Not on file    Years of education: Not on file    Highest education level: Not on file   Occupational History    Not on file   Social Needs    Financial resource strain: Not on file    Food insecurity     Worry: Not on file     Inability: Not on file    Transportation needs     Medical: Not on file     Non-medical: Not on file   Tobacco Use    Smoking status: Never Smoker    Smokeless tobacco: Never Used   Substance and Sexual Activity    Alcohol use: Yes     Frequency: Monthly or less     Drinks per session: 1 or 2     Comment: social    Drug use: Never    Sexual activity: Not on file   Lifestyle    Physical activity     Days per week: Not on file     Minutes per session: Not on file    Stress: Not on file   Relationships    Social connections     Talks on phone: Not on file     Gets together: Not on file     Attends Quaker service: Not on file     Active member of club or organization: Not on file     Attends meetings of clubs or organizations: Not on file     Relationship status: Not on file    Intimate partner violence     Fear of current or ex partner: Not on file     Emotionally abused: Not on file     Physically abused: Not on file     Forced sexual activity: Not on file   Other Topics Concern    Not on file   Social History Narrative    Not on file       Current Outpatient Medications:     albuterol (PROVENTIL HFA,VENTOLIN HFA) 90 mcg/act inhaler, Inhale 2 puffs every 6 (six) hours as needed, Disp: , Rfl:     azelastine (ASTELIN) 0 1 % nasal spray, 1 spray into each nostril 2 (two) times a day Use in each nostril as directed, Disp: , Rfl:     Blood Pressure Monitoring (Blood Pressure Monitor 3) JEANNINE, USE 2 (TWO) TIMES A DAY, Disp: 1 Device, Rfl: 0    budesonide-formoterol (SYMBICORT) 160-4 5 mcg/act inhaler, Inhale 2 puffs 2 (two) times a day Rinse mouth after use , Disp: , Rfl:     fluticasone (FLONASE) 50 mcg/act nasal spray, 2 sprays into each nostril daily, Disp: , Rfl:     loratadine (CLARITIN) 10 mg tablet, Take 10 mg by mouth daily, Disp: , Rfl:     pantoprazole (PROTONIX) 40 mg tablet, Take 1 tablet (40 mg total) by mouth daily, Disp: 30 tablet, Rfl: 11  No Known Allergies  Vitals:    03/18/21 1416   BP: 130/84   Pulse: 84   Resp: 16   Temp: 98 2 °F (36 8 °C)       Physical Exam   Constitutional: General appearance:  The Patient is well-developed and well-nourished who appears the stated age in no acute distress  Patient is pleasant and talkative  HEENT:  Normocephalic  Sclerae are anicteric  Mucous membranes are moist  Neck is supple without adenopathy  No JVD  Chest: The lungs are clear to auscultation  Cardiac: Heart is regular rate  Abdomen: Abdomen is soft, non-tender, non-distended and without masses  Extremities: There is no clubbing or cyanosis  There is no edema  Symmetric  Neuro: Grossly nonfocal  Gait is normal      Lymphatic: No evidence of cervical adenopathy bilaterally  No evidence of axillary adenopathy bilaterally  No evidence of inguinal adenopathy bilaterally  Skin: Warm, anicteric  Psych:  Patient is pleasant and talkative  Breasts:      Pathology:  [unfilled]    Labs:      Imaging    I personally reviewed his CT  I reviewed the above laboratory and imaging data  Discussion/Summary:  60-year-old male with a pancreas cyst by CT and a family history of pancreas cancer  I have recommended that he undergo MRI with MRCP  This should better delineate this cyst as well as determine if there are worrisome or suspicious features  I will see him back once we have those results  I discussed that this does not appear to be main duct IPMN which has a 50-70% risk of malignancy in his lifetime  This may be side branch IPMN with a 10-20% risk of malignancy in his lifetime  I have also recommended that he see Medical Genetics given his significant family history and personal history of cancer  I will see him back once we have the results of the MRI  Assuming this is still present, this will likely require yearly follow-up  I will see him back after the MRI  All of his questions were answered

## 2021-03-29 DIAGNOSIS — K21.9 GASTROESOPHAGEAL REFLUX DISEASE WITHOUT ESOPHAGITIS: Primary | ICD-10-CM

## 2021-03-29 NOTE — TELEPHONE ENCOUNTER
Dr Nilton White - patient went to refill pantoprazole at [facility] - was told that [facility] is requiring approval  Can we please call Danitza Desir, he is concerned   [phone]

## 2021-03-30 NOTE — TELEPHONE ENCOUNTER
Called [facility] [phone] spoke with pharmacist he ran a claim for pantoprazole 40 mg daily and confirmed no prior auth is needed  They will contact pt  Attempted to call pt but his voicemail box is not seup and was unable to leave a message

## 2021-04-02 ENCOUNTER — HOSPITAL ENCOUNTER (OUTPATIENT)
Dept: GASTROENTEROLOGY | Facility: HOSPITAL | Age: 61
Setting detail: OUTPATIENT SURGERY
Discharge: HOME | End: 2021-04-02
Attending: INTERNAL MEDICINE | Admitting: INTERNAL MEDICINE
Payer: COMMERCIAL

## 2021-04-02 ENCOUNTER — ANESTHESIA EVENT (OUTPATIENT)
Dept: GASTROENTEROLOGY | Facility: HOSPITAL | Age: 61
End: 2021-04-02

## 2021-04-02 ENCOUNTER — ANESTHESIA (OUTPATIENT)
Dept: GASTROENTEROLOGY | Facility: HOSPITAL | Age: 61
End: 2021-04-02

## 2021-04-02 VITALS
SYSTOLIC BLOOD PRESSURE: 127 MMHG | BODY MASS INDEX: 28.37 KG/M2 | HEIGHT: 73 IN | HEART RATE: 57 BPM | WEIGHT: 214.07 LBS | DIASTOLIC BLOOD PRESSURE: 81 MMHG | TEMPERATURE: 97.7 F | OXYGEN SATURATION: 99 % | RESPIRATION RATE: 21 BRPM

## 2021-04-02 DIAGNOSIS — Z86.010 HISTORY OF COLONIC POLYPS: ICD-10-CM

## 2021-04-02 PROBLEM — I10 ESSENTIAL HYPERTENSION: Status: ACTIVE | Noted: 2021-04-02

## 2021-04-02 PROBLEM — Z90.5 H/O PARTIAL NEPHRECTOMY: Status: ACTIVE | Noted: 2021-04-02

## 2021-04-02 PROBLEM — E78.2 MIXED HYPERLIPIDEMIA: Status: ACTIVE | Noted: 2021-04-02

## 2021-04-02 PROBLEM — C64.1 RENAL CELL CARCINOMA OF RIGHT KIDNEY (HCC): Status: ACTIVE | Noted: 2021-04-02

## 2021-04-02 PROCEDURE — 88305 TISSUE EXAM BY PATHOLOGIST: CPT | Performed by: PATHOLOGY

## 2021-04-02 PROCEDURE — 45385 COLONOSCOPY W/LESION REMOVAL: CPT | Performed by: INTERNAL MEDICINE

## 2021-04-02 RX ORDER — SODIUM CHLORIDE, SODIUM LACTATE, POTASSIUM CHLORIDE, CALCIUM CHLORIDE 600; 310; 30; 20 MG/100ML; MG/100ML; MG/100ML; MG/100ML
INJECTION, SOLUTION INTRAVENOUS CONTINUOUS PRN
Status: DISCONTINUED | OUTPATIENT
Start: 2021-04-02 | End: 2021-04-02

## 2021-04-02 RX ORDER — PROPOFOL 10 MG/ML
INJECTION, EMULSION INTRAVENOUS AS NEEDED
Status: DISCONTINUED | OUTPATIENT
Start: 2021-04-02 | End: 2021-04-02

## 2021-04-02 RX ADMIN — PROPOFOL 20 MG: 10 INJECTION, EMULSION INTRAVENOUS at 12:24

## 2021-04-02 RX ADMIN — PROPOFOL 20 MG: 10 INJECTION, EMULSION INTRAVENOUS at 12:21

## 2021-04-02 RX ADMIN — PROPOFOL 100 MG: 10 INJECTION, EMULSION INTRAVENOUS at 12:19

## 2021-04-02 RX ADMIN — SODIUM CHLORIDE, SODIUM LACTATE, POTASSIUM CHLORIDE, AND CALCIUM CHLORIDE: .6; .31; .03; .02 INJECTION, SOLUTION INTRAVENOUS at 12:04

## 2021-04-02 RX ADMIN — PROPOFOL 20 MG: 10 INJECTION, EMULSION INTRAVENOUS at 12:26

## 2021-04-02 RX ADMIN — PROPOFOL 20 MG: 10 INJECTION, EMULSION INTRAVENOUS at 12:27

## 2021-04-02 RX ADMIN — PROPOFOL 20 MG: 10 INJECTION, EMULSION INTRAVENOUS at 12:22

## 2021-04-02 NOTE — ANESTHESIA PREPROCEDURE EVALUATION
Procedure:  COLONOSCOPY    Relevant Problems   CARDIO   (+) Essential hypertension   (+) Mixed hyperlipidemia      GI/HEPATIC   (+) Gastroesophageal reflux disease without esophagitis   (+) Pancreatic cyst      /RENAL   (+) Renal cell carcinoma of right kidney (HCC)      NEURO/PSYCH   (+) History of colonic polyps      Other   (+) H/O partial nephrectomy (right, 2008)        Physical Exam    Airway    Mallampati score: II  TM Distance: >3 FB  Neck ROM: full     Dental       Cardiovascular      Pulmonary      Other Findings        Anesthesia Plan  ASA Score- 2     Anesthesia Type- IV sedation with anesthesia with ASA Monitors  Additional Monitors:   Airway Plan:     Comment: Recent labs personally reviewed:  No results found for: WBC, HGB, PLT  No results found for: NA, K, BUN, CREATININE, GLUCOSE  No results found for: PTT   No results found for: INR    Blood type     I, Mary Mccarty MD, have personally seen and evaluated the patient prior to anesthetic care  I have reviewed the pre-anesthetic record, medical history, allergies, medications and any other medical records if appropriate to the anesthetic care  If a CRNA is involved in the case, I have reviewed the CRNA assessment, if present, and agree  Patient consented for IV Sedation, general anesthesia as back up  Discussed risks of aspiration, IV infiltration, indications for conversion to general anesthesia  All questions and concerns addressed          Plan Factors-Exercise tolerance (METS): >4 METS  Chart reviewed  Patient is not a current smoker  Patient did not smoke on day of surgery  Obstructive sleep apnea risk education given perioperatively  Induction- intravenous  Postoperative Plan-     Informed Consent- Anesthetic plan and risks discussed with patient  I personally reviewed this patient with the CRNA  Discussed and agreed on the Anesthesia Plan with the CRNA  Gio Navarro

## 2021-04-02 NOTE — H&P
History and Physical - [facility]  Woody Senior 62 y o  male MRN: [id number]                  HPI: Woody Senior is a 62y o  year old male who presents for colonoscopy for large carpeting cecal polyp status post piecemeal polypectomy      REVIEW OF SYSTEMS: Per the HPI, and otherwise unremarkable  Historical Information   Past Medical History:   Diagnosis Date    Asthma     Chronic kidney disease     Colon polyp     GERD (gastroesophageal reflux disease)     Renal cancer, right (HCC)      Past Surgical History:   Procedure Laterality Date    CHOLECYSTECTOMY      COLONOSCOPY      KIDNEY SURGERY      NISSEN FUNDOPLICATION       Social History   Social History     Substance and Sexual Activity   Alcohol Use Yes    Frequency: Monthly or less    Drinks per session: 1 or 2    Comment: social     Social History     Substance and Sexual Activity   Drug Use Never     Social History     Tobacco Use   Smoking Status Never Smoker   Smokeless Tobacco Never Used     Family History   Problem Relation Age of Onset    Colon cancer Mother     Prostate cancer Father        Meds/Allergies     (Not in a hospital admission)      No Known Allergies    Objective     Blood pressure (!) 179/96, pulse 57, temperature (!) 97 1 °F (36 2 °C), temperature source Temporal, resp  rate 18, height 6' 1" (1 854 m), weight 97 1 kg (214 lb 1 1 oz), SpO2 99 %  PHYSICAL EXAM    BP (!) 179/96   Pulse 57   Temp (!) 97 1 °F (36 2 °C) (Temporal)   Resp 18   Ht 6' 1" (1 854 m)   Wt 97 1 kg (214 lb 1 1 oz)   SpO2 99%   BMI 28 24 kg/m²       Gen: NAD  CV: RRR  CHEST: Clear  ABD: soft, NT/ND  EXT: no edema      ASSESSMENT/PLAN:  This is a 62y o  year old male here for colonoscopy, and he is stable and optimized for his procedure

## 2021-04-06 ENCOUNTER — TELEPHONE (OUTPATIENT)
Dept: GASTROENTEROLOGY | Facility: CLINIC | Age: 61
End: 2021-04-06

## 2021-04-06 LAB
BUN SERPL-MCNC: 22 MG/DL (ref 7–25)
BUN/CREAT SERPL: NORMAL (CALC) (ref 6–22)
CREAT SERPL-MCNC: 1.24 MG/DL (ref 0.7–1.33)
SL AMB EGFR AFRICAN AMERICAN: 73 ML/MIN/1.73M2
SL AMB EGFR NON AFRICAN AMERICAN: 63 ML/MIN/1.73M2

## 2021-04-06 RX ORDER — ESOMEPRAZOLE MAGNESIUM 40 MG/1
40 CAPSULE, DELAYED RELEASE ORAL DAILY
Qty: 90 CAPSULE | Refills: 3 | Status: SHIPPED | OUTPATIENT
Start: 2021-04-06 | End: 2022-03-18

## 2021-04-06 NOTE — TELEPHONE ENCOUNTER
Tried on covermymeds but was advised this is not primary insurance  Called [employer] as pt requested and can only get through to a voice mail  LMOM on the voice mail requesting needing to do a prior auth and to call our office back  Called pt and advised    Pt said his main insurance is [facility]  Through [employer]  [id number] [id number]  N [id number]  ID [id number]

## 2021-04-06 NOTE — TELEPHONE ENCOUNTER
I think his insurance plan is probably excluding all PPIs so he will have to use something over the counter if they aren't covering esomeprazole or pantoprazole  Can you check his covermymeds and see if there is any PPI listed such as omeprazole, lansoprazole, dexlansoprazole? If not we will have him take omeprazole 20 mg BID OTC

## 2021-04-06 NOTE — TELEPHONE ENCOUNTER
Dr Ronit Castorena - Patient called - pantoprazole does require [place] patient spoke with Insurance [employer] - they gave him this number for office to call [phone]    Any questions please call Hanh Rodrigues at [phone]

## 2021-04-06 NOTE — TELEPHONE ENCOUNTER
Went on covermymeds to submit prior auth on the primary insurance the pt provided  KEY [id number]   No auth could be done since this medication is pantoprazole is excluded from pt's plan  Routing to pa  What other alternative medication would you suggest for pt?   Thank you

## 2021-04-06 NOTE — TELEPHONE ENCOUNTER
----- Message from Kendal Lynch MD sent at 4/6/2021  7:55 AM EDT -----  Please tell him the polyp was precancerous and have a colonoscopy in 3 years

## 2021-04-06 NOTE — TELEPHONE ENCOUNTER
I'm not sure if they exclude all prescription PPIs or just pantoprazole but we can try send esomeprazole 40 mg daily and see if that works

## 2021-04-06 NOTE — TELEPHONE ENCOUNTER
Went on covermymeds on pt's primary insurance as per what pt gave  This medication is excluded from pt's plan    Advised pt  Routing to pa  What alternative would you like for pt to use instead of the pantoprazole?   Thank you

## 2021-04-07 NOTE — TELEPHONE ENCOUNTER
Called pt and advised to call insurance to find out what PPI  Is covered  Pt said he called his secondary and getting it straightened out becasuse of working at the [employer] [facility] [employer] [facility] his secondary is to cover what his primary doesn't cover and he has a certified condition caused by the [employer] [facility] and everything is supposed to be covered  Pt will call back later when he hears back from his secondary insurance

## 2021-04-08 NOTE — TELEPHONE ENCOUNTER
Francesca from The [address] [place] [address] Insurance called  Patient's pantoprazole is approved and covered for a period of 1 year  04/07/21 - 04/07/2022   The referenced work be 04/08/21 11:16 CMT

## 2021-04-13 ENCOUNTER — HOSPITAL ENCOUNTER (OUTPATIENT)
Dept: RADIOLOGY | Facility: HOSPITAL | Age: 61
Discharge: HOME | End: 2021-04-13
Attending: SURGERY
Payer: COMMERCIAL

## 2021-04-13 DIAGNOSIS — K86.2 PANCREATIC CYST: ICD-10-CM

## 2021-04-13 PROCEDURE — 74183 MRI ABD W/O CNTR FLWD CNTR: CPT

## 2021-04-13 PROCEDURE — A9585 GADOBUTROL INJECTION: HCPCS | Performed by: SURGERY

## 2021-04-13 PROCEDURE — G1004 CDSM NDSC: HCPCS

## 2021-04-13 PROCEDURE — 76376 3D RENDER W/INTRP POSTPROCES: CPT

## 2021-04-13 RX ADMIN — GADOBUTROL 10 ML: 604.72 INJECTION INTRAVENOUS at 08:46

## 2021-04-15 ENCOUNTER — OFFICE VISIT (OUTPATIENT)
Dept: SURGICAL ONCOLOGY | Facility: CLINIC | Age: 61
End: 2021-04-15
Payer: COMMERCIAL

## 2021-04-15 VITALS
BODY MASS INDEX: 29.42 KG/M2 | HEIGHT: 73 IN | TEMPERATURE: 96 F | RESPIRATION RATE: 12 BRPM | SYSTOLIC BLOOD PRESSURE: 140 MMHG | DIASTOLIC BLOOD PRESSURE: 98 MMHG | HEART RATE: 72 BPM | WEIGHT: 222 LBS

## 2021-04-15 DIAGNOSIS — K86.2 PANCREATIC CYST: Primary | ICD-10-CM

## 2021-04-15 PROCEDURE — 99213 OFFICE O/P EST LOW 20 MIN: CPT | Performed by: SURGERY

## 2021-04-15 NOTE — LETTER
April 15, 2021     [place], [address] [facility]  [address]  [facility]    Patient: Sylvie Galvez   YOB: 1960   Date of Visit: 4/15/2021       Dear Dr Hernandez Heart: Thank you for referring Sylvie Galvez to me for evaluation  Below are my notes for this consultation  If you have questions, please do not hesitate to call me  I look forward to following your patient along with you  Sincerely,        Kari Perez MD        CC: MD Wang Nichols MD Bethanne Cota, MD  4/15/2021 10:06 AM  Sign when Signing Visit               Surgical Oncology Follow Up       [facility]  [address]  [place] [address]    Sylvie Galvez  1960  [id number]  [address] [facility] SURGICAL ONCOLOGY [place]  [address]  [place] [address]    Diagnoses and all orders for this visit:    Pancreatic cyst  -     MRI abdomen w wo contrast and mrcp; Future  -     BUN; Future  -     Creatinine, serum; Future        Chief Complaint   Patient presents with    Follow-up     MRI       Return in about 1 year (around 4/15/2022) for Office Visit, Imaging - See orders  Oncology History    No history exists  History of Present Illness:   Patient returns follow-up of his pancreas cyst   This was initially seen on imaging in  by CT  A follow-up MRI in 2018 did not reveal this  He continues to feel well without any abdominal pain, nausea or vomiting  Does have a history of renal cell carcinoma  He is following up with [facility] next month  His family history includes a half brother  of metastatic pancreas cancer  His MRI from 2021 revealed a 1 8 cm cyst in the pancreatic neck  This was previously 1 5 cm  No there were no worrisome or suspicious features  I personally reviewed his films and discussed this with Radiology    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  Review of Systems  Complete ROS Surg Onc:   Complete ROS Surg Onc:   Constitutional: The patient denies new or recent history of general fatigue, no recent weight loss, no change in appetite  Eyes: No complaints of visual problems, no scleral icterus  ENT: no complaints of ear pain, no hoarseness, no difficulty swallowing,  no tinnitus and no new masses in head, oral cavity, or neck  Cardiovascular: No complaints of chest pain, no palpitations, no ankle edema  Respiratory: No complaints of shortness of breath, no cough  Gastrointestinal: No complaints of jaundice, no bloody stools, no pale stools  Genitourinary: No complaints of dysuria, no hematuria, no nocturia, no frequent urination, no urethral discharge  Musculoskeletal: No complaints of weakness, paralysis, joint stiffness or arthralgias  Integumentary: No complaints of rash, no new lesions  Neurological: No complaints of convulsions, no seizures, no dizziness  Hematologic/Lymphatic: No complaints of easy bruising  Endocrine:  No hot or cold intolerance  No polydipsia, polyphagia, or polyuria  Allergy/immunology:  No environmental allergies  No food allergies  Not immunocompromised  Skin:  No pallor or rash  No wound          Patient Active Problem List   Diagnosis    Gastroesophageal reflux disease without esophagitis    History of colonic polyps    Kidney disease    Adrenal adenoma, right    Pancreatic cyst    Family history of pancreatic cancer    [facility]    H/O partial nephrectomy    Essential hypertension    Mixed hyperlipidemia     Past Medical History:   Diagnosis Date    Asthma     Chronic kidney disease     Colon polyp     GERD (gastroesophageal reflux disease)     [facility] cancer, [facility]      Past Surgical History:   Procedure Laterality Date    CHOLECYSTECTOMY      COLONOSCOPY      KIDNEY SURGERY      NISSEN FUNDOPLICATION       Family History   Problem Relation Age of Onset    Colon cancer Mother     Prostate cancer Father      Social History     Socioeconomic History    Marital status: /Civil Union     Spouse name: Not on file    Number of children: Not on file    Years of education: Not on file    Highest education level: Not on file   Occupational History    Not on file   Social Needs    Financial resource strain: Not on file    Food insecurity     Worry: Not on file     Inability: Not on file   [employer] needs     Medical: Not on file     Non-medical: Not on file   Tobacco Use    Smoking status: Never Smoker    Smokeless tobacco: Never Used   Substance and Sexual Activity    Alcohol use: Yes     Frequency: Monthly or less     Drinks per session: 1 or 2     Comment: social    Drug use: Never    Sexual activity: Not on file   Lifestyle    Physical activity     Days per week: Not on file     Minutes per session: Not on file    Stress: Not on file   Relationships    Social connections     Talks on phone: Not on file     Gets together: Not on file     Attends Muslim service: Not on file     Active member of club or organization: Not on file     Attends meetings of clubs or organizations: Not on file     Relationship status: Not on file    Intimate partner violence     Fear of current or ex partner: Not on file     Emotionally abused: Not on file     Physically abused: Not on file     Forced sexual activity: Not on file   Other Topics Concern    Not on file   Social History Narrative    Not on file       Current Outpatient Medications:     albuterol (PROVENTIL HFA,VENTOLIN HFA) 90 mcg/act inhaler, Inhale 2 puffs every 6 (six) hours as needed, Disp: , Rfl:     azelastine (ASTELIN) 0 1 % nasal spray, 1 spray into each nostril 2 (two) times a day Use in each nostril as directed, Disp: , Rfl:     Blood Pressure Monitoring (Blood Pressure Monitor 3) JEANNINE, USE 2 (TWO) TIMES A DAY, Disp: 1 Device, Rfl: 0   budesonide-formoterol (SYMBICORT) 160-4 5 mcg/act inhaler, Inhale 2 puffs 2 (two) times a day Rinse mouth after use , Disp: , Rfl:     fluticasone (FLONASE) 50 mcg/act nasal spray, 2 sprays into each nostril daily, Disp: , Rfl:     loratadine (CLARITIN) 10 mg tablet, Take 10 mg by mouth daily, Disp: , Rfl:     pantoprazole (PROTONIX) 40 mg tablet, Take 1 tablet (40 mg total) by mouth daily, Disp: 30 tablet, Rfl: 11    esomeprazole (NexIUM) 40 MG capsule, Take 1 capsule (40 mg total) by mouth daily (Patient not taking: Reported on 4/15/2021), Disp: 90 capsule, Rfl: 3  No Known Allergies  Vitals:    04/15/21 0937   BP: 140/98   Pulse: 72   Resp: 12   Temp: (!) 96 °F (35 6 °C)       Physical Exam  Constitutional: General appearance: The Patient is well-developed and well-nourished who appears the stated age in no acute distress  Patient is pleasant and talkative  HEENT:  Normocephalic  Sclerae are anicteric  Mucous membranes are moist  Neck is supple without adenopathy  No JVD  Chest: The lungs are clear to auscultation  Cardiac: Heart is regular rate  Abdomen: Abdomen is soft, non-tender, non-distended and without masses  Extremities: There is no clubbing or cyanosis  There is no edema  Symmetric  Neuro: Grossly nonfocal  Gait is normal      Lymphatic: No evidence of cervical adenopathy bilaterally  No evidence of axillary adenopathy bilaterally  Skin: Warm, anicteric  Psych:  Patient is pleasant and talkative  Breasts:        Pathology:  [unfilled]    Labs:      Imaging  No results found  I reviewed the above laboratory and imaging data  Discussion/Summary: 81-year-old male with a pancreas cyst by CT and a family history of pancreas cancer  The MRI suggest that this is side branch IPMN  I discussed with main duct IPMN there is a 50-70% risk of malignancy in his lifetime  With side branch IPMN there is a 10-20% risk of malignancy in his lifetime   There is also a 10% risk of developing a malignancy elsewhere in the pancreas  Since this has only grown by 3 mm in 4 years, I would recommend yearly MRI  We also discussed endoscopic ultrasound, but I doubt that would  at this time  I will see him back in 1 year with a repeat MRI  We will await the results of his genetic testing next month  He is agreeable to this plan  All of his questions were answered

## 2021-04-15 NOTE — PROGRESS NOTES
Surgical Oncology Follow Up       [facility]  [facility] [place]  [address]  [facility] [place] [address]    Tracy Gomez  1960  [id number]  [facility]  [facility]  [address]  [facility] [place] [address]    Diagnoses and all orders for this visit:    Pancreatic cyst  -     MRI abdomen w wo contrast and mrcp; Future  -     BUN; Future  -     Creatinine, serum; Future        Chief Complaint   Patient presents with    Follow-up     MRI       Return in about 1 year (around 4/15/2022) for Office Visit, Imaging - See orders  Oncology History    No history exists  History of Present Illness:   Patient returns follow-up of his pancreas cyst   This was initially seen on imaging in  by CT  A follow-up MRI in 2018 did not reveal this  He continues to feel well without any abdominal pain, nausea or vomiting  Does have a history of renal cell carcinoma  He is following up with [facility] next month  His family history includes a half brother  of metastatic pancreas cancer  His MRI from 2021 revealed a 1 8 cm cyst in the pancreatic neck  This was previously 1 5 cm  No there were no worrisome or suspicious features  I personally reviewed his films and discussed this with Radiology  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  Review of Systems  Complete ROS Surg Onc:   Complete ROS Surg Onc:   Constitutional: The patient denies new or recent history of general fatigue, no recent weight loss, no change in appetite  Eyes: No complaints of visual problems, no scleral icterus  ENT: no complaints of ear pain, no hoarseness, no difficulty swallowing,  no tinnitus and no new masses in head, oral cavity, or neck  Cardiovascular: No complaints of chest pain, no palpitations, no ankle edema  Respiratory: No complaints of shortness of breath, no cough  Gastrointestinal: No complaints of jaundice, no bloody stools, no pale stools  Genitourinary: No complaints of dysuria, no hematuria, no nocturia, no frequent urination, no urethral discharge  Musculoskeletal: No complaints of weakness, paralysis, joint stiffness or arthralgias  Integumentary: No complaints of rash, no new lesions  Neurological: No complaints of convulsions, no seizures, no dizziness  Hematologic/Lymphatic: No complaints of easy bruising  Endocrine:  No hot or cold intolerance  No polydipsia, polyphagia, or polyuria  Allergy/immunology:  No environmental allergies  No food allergies  Not immunocompromised  Skin:  No pallor or rash  No wound          Patient Active Problem List   Diagnosis    Gastroesophageal reflux disease without esophagitis    History of colonic polyps    Kidney disease    Adrenal adenoma, right    Pancreatic cyst    Family history of pancreatic cancer    [facility]    H/O partial nephrectomy    Essential hypertension    Mixed hyperlipidemia     Past Medical History:   Diagnosis Date    Asthma     Chronic kidney disease     Colon polyp     GERD (gastroesophageal reflux disease)     [facility] )      Past Surgical History:   Procedure Laterality Date    CHOLECYSTECTOMY      COLONOSCOPY      KIDNEY SURGERY      NISSEN FUNDOPLICATION       Family History   Problem Relation Age of Onset    Colon cancer Mother     Prostate cancer Father      Social History     Socioeconomic History    Marital status: /Civil Union     Spouse name: Not on file    Number of children: Not on file    Years of education: Not on file    Highest education level: Not on file   Occupational History    Not on file   Social Needs    Financial resource strain: Not on file    Food insecurity     Worry: Not on file     Inability: Not on file    Transportation needs     Medical: Not on file     Non-medical: Not on file Tobacco Use    Smoking status: Never Smoker    Smokeless tobacco: Never Used   Substance and Sexual Activity    Alcohol use: Yes     Frequency: Monthly or less     Drinks per session: 1 or 2     Comment: social    Drug use: Never    Sexual activity: Not on file   Lifestyle    Physical activity     Days per week: Not on file     Minutes per session: Not on file    Stress: Not on file   Relationships    Social connections     Talks on phone: Not on file     Gets together: Not on file     Attends Bahai service: Not on file     Active member of club or organization: Not on file     Attends meetings of clubs or organizations: Not on file     Relationship status: Not on file    Intimate partner violence     Fear of current or ex partner: Not on file     Emotionally abused: Not on file     Physically abused: Not on file     Forced sexual activity: Not on file   Other Topics Concern    Not on file   Social History Narrative    Not on file       Current Outpatient Medications:     albuterol (PROVENTIL HFA,VENTOLIN HFA) 90 mcg/act inhaler, Inhale 2 puffs every 6 (six) hours as needed, Disp: , Rfl:     azelastine (ASTELIN) 0 1 % nasal spray, 1 spray into each nostril 2 (two) times a day Use in each nostril as directed, Disp: , Rfl:     Blood Pressure Monitoring (Blood Pressure Monitor 3) JEANNINE, USE 2 (TWO) TIMES A DAY, Disp: 1 Device, Rfl: 0    budesonide-formoterol (SYMBICORT) 160-4 5 mcg/act inhaler, Inhale 2 puffs 2 (two) times a day Rinse mouth after use , Disp: , Rfl:     fluticasone (FLONASE) 50 mcg/act nasal spray, 2 sprays into each nostril daily, Disp: , Rfl:     loratadine (CLARITIN) 10 mg tablet, Take 10 mg by mouth daily, Disp: , Rfl:     pantoprazole (PROTONIX) 40 mg tablet, Take 1 tablet (40 mg total) by mouth daily, Disp: 30 tablet, Rfl: 11    esomeprazole (NexIUM) 40 MG capsule, Take 1 capsule (40 mg total) by mouth daily (Patient not taking: Reported on 4/15/2021), Disp: 90 capsule, Rfl: 3  No Known Allergies  Vitals:    04/15/21 0937   BP: 140/98   Pulse: 72   Resp: 12   Temp: (!) 96 °F (35 6 °C)       Physical Exam  Constitutional: General appearance: The Patient is well-developed and well-nourished who appears the stated age in no acute distress  Patient is pleasant and talkative  HEENT:  Normocephalic  Sclerae are anicteric  Mucous membranes are moist  Neck is supple without adenopathy  No JVD  Chest: The lungs are clear to auscultation  Cardiac: Heart is regular rate  Abdomen: Abdomen is soft, non-tender, non-distended and without masses  Extremities: There is no clubbing or cyanosis  There is no edema  Symmetric  Neuro: Grossly nonfocal  Gait is normal      Lymphatic: No evidence of cervical adenopathy bilaterally  No evidence of axillary adenopathy bilaterally  Skin: Warm, anicteric  Psych:  Patient is pleasant and talkative  Breasts:        Pathology:  [unfilled]    Labs:      Imaging  No results found  I reviewed the above laboratory and imaging data  Discussion/Summary: 60-year-old male with a pancreas cyst by CT and a family history of pancreas cancer  The MRI suggest that this is side branch IPMN  I discussed with main duct IPMN there is a 50-70% risk of malignancy in his lifetime  With side branch IPMN there is a 10-20% risk of malignancy in his lifetime  There is also a 10% risk of developing a malignancy elsewhere in the pancreas  Since this has only grown by 3 mm in 4 years, I would recommend yearly MRI  We also discussed endoscopic ultrasound, but I doubt that would  at this time  I will see him back in 1 year with a repeat MRI  We will await the results of his genetic testing next month  He is agreeable to this plan  All of his questions were answered

## 2021-05-25 NOTE — PROGRESS NOTES
Pre-Test Genetic Counseling Consult Note    Patient Name: Nga Daniels DOB/Age: 1960/61 y o  Referring Provider: Milka Lewis MD, FACS    Date of Service: 2021  Genetic Counselor: Timothy Smith MS, [facility]  Interpretation Services: None  Location: In-person consult at [facility] of Visit: 61 minutes      Geovani Casillas was referred to the [address] [place] [address] and Genetic Assessment Program due to his personal history of renal carcinoma and family history of pancreatic cancer  he presents today to discuss the possibility of a hereditary cancer syndrome, options for genetic testing, and implications for him and his family          Cancer History and Treatment:   Personal History:  History of right papillary RCC - patient had surgery for pT1a RCC in   Last imaging in  negative for recurrence     Pancreatic Cyst:  Identified incidentally in  on CT  Follow up MRI in , did not identify a cyst  MRI 21- 1 8cm cyst on pancreatic neck, 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    Plans for MRI fu with Dr Elizabeth Terrell in 1y    Screening Hx:     Colon:  Colonoscopy: 21   1 tubulovillous adenoma   2 tubular adenoma   1 benign inflammatory polyp    Endoscopy 21: 1 sessile serrated adenoma    Per patient report: 3-5 polyps on every colonoscopy since     Skin:  Skin cancer screening: none    Prostate:  Prostate screening 21, nml    Other screening: none    Medical and Surgical History  Pertinent surgical history:   Past Surgical History:   Procedure Laterality Date    CHOLECYSTECTOMY      COLONOSCOPY      KIDNEY SURGERY      NISSEN FUNDOPLICATION        Pertinent medical history:  Past Medical History:   Diagnosis Date    Asthma     Chronic kidney disease     Colon polyp     GERD (gastroesophageal reflux disease)     Renal cancer, right ([address] )          Other History:  Height:   Ht Readings from Last 1 Encounters:   04/15/21 6' 1" (1 854 m)     Weight:   Wt Readings from Last 1 Encounters:   04/15/21 101 kg (222 lb)       Relevant Family History     Reported Ancestry: Effie Mo has 3 paternal half-siblings that he had contact with  He reports additional half siblings, but does not know their information  His siblings' history includes:   Brother(s): 2  o  (79) history of pancreatic cancer diagnosed at 79  o  (62) no cancer history   Sister(s): 76 no cancer history    Robbie's maternal family history includes:   Mother:  (80) history of colon cancer diagnosed at 78- mets to brain and liver   Sammie Bailey reports 21 aunts and uncles, the following individuals are who he recalls to have had a history of cancer   Aunt(s): 2, 76s with history of cancer NOS   Uncle(s): history of bladder cancer   Cousin(s): no history of cancer   Grandmother: , history of bladder cancer   Grandfather: , no information    Robbie's paternal family history includes:   Father:  (80), history of prostate cancer diagnosed at 76   Aunt(s): 3, limited information   Uncle(s): 2, limited information   Cousin(s): limited information   Grandmother: limited information   Grandfather: limited information      Please refer to the scanned pedigree in the Media Tab for a complete family history     *All history is reported as provided by the patient; records are not available for review, except where indicated  Assessment:  We discussed sporadic, familial and hereditary cancer  We also discussed the many factors that influence our risk for cancer such as age, environmental exposures, lifestyle choices and family history  We reviewed the indications suggestive of a hereditary predisposition to cancer      Genetic testing is indicated for Sammie Bailey based on the following criteria: Meets NCCN V1 2020 Testing Criteria for Pancreatic Cancer Susceptibility Genes: Family history of a second-degree relative diagnosed with pancreatic cancer    The risks, benefits, and limitations of genetic testing were reviewed with the patient, as well as genetic discrimination laws, and possible test results (positive, negative, variants of uncertain significance) and their clinical implications  If positive for a mutation, options for managing cancer risk including increased surveillance, chemoprevention, and in some cases prophylactic surgery were discussed  Maya Grigsby was informed that if a hereditary cancer syndrome was identified in him, first degree relatives (parents, siblings, and children) have a chance of also inheriting the condition  Genetic testing would allow for predictive genetic testing in other relatives, who may also be at risk depending on their degree of relation  Plan: Patient decided not to proceed with testing at this time  Summary:     Maya Grigsby would like more time to think about whether he would like to pursue genetic testing  I gave him an informational packed and our contct card should he be interested in the future

## 2021-05-26 ENCOUNTER — CLINICAL SUPPORT (OUTPATIENT)
Dept: GENETICS | Facility: CLINIC | Age: 61
End: 2021-05-26

## 2021-05-26 DIAGNOSIS — Z80.0 FAMILY HISTORY OF PANCREATIC CANCER: Primary | ICD-10-CM

## 2021-05-26 DIAGNOSIS — Z80.0 FAMILY HISTORY OF COLON CANCER: ICD-10-CM

## 2021-05-26 DIAGNOSIS — Z80.42 FAMILY HISTORY OF PROSTATE CANCER: ICD-10-CM

## 2021-05-26 DIAGNOSIS — C64.1 RENAL CELL CARCINOMA OF RIGHT KIDNEY (HCC): ICD-10-CM

## 2021-05-26 NOTE — LETTER
May 26, 2021     Hilda Irizarry MD  [address] [facility]    Patient: Eliot Peters  YOB: 1960  Date of Visit: 2021      Dear Dr Berny Betts:    Thank you for referring Eliot Peters to me for evaluation  Below are my notes for this consultation  If you have questions, please do not hesitate to call me  I look forward to following your patient along with you  Sincerely,        Rex Vargas        CC: Brady Fernandez MD        Pre-Test Genetic Counseling Consult Note    Patient Name: Eliot Peters   /Age: 1960/61 y o  Referring Provider: Hilda Irizarry MD, FACS    Date of Service: 2021  Genetic Counselor: Rex Vargas MS, [facility]  Interpretation Services: None  Location: In-person consult at [facility] of Visit: 61 minutes      Regis Singleton was referred to the [address] [place] [address] and Genetic Assessment Program due to his personal history of renal carcinoma and family history of pancreatic cancer  he presents today to discuss the possibility of a hereditary cancer syndrome, options for genetic testing, and implications for him and his family          Cancer History and Treatment:   Personal History:  History of right papillary RCC - patient had surgery for pT1a RCC in   Last imaging in 2017 negative for recurrence     Pancreatic Cyst:  Identified incidentally in  on CT  Follow up MRI in 2018, did not identify a cyst  MRI 21- 1 8cm cyst on pancreatic neck, 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    Plans for MRI fu with Dr Berny Betts in 1y    Screening Hx:     Colon:  Colonoscopy: 21   1 tubulovillous adenoma   2 tubular adenoma   1 benign inflammatory polyp    Endoscopy 21: 1 sessile serrated adenoma    Per patient report: 3-5 polyps on every colonoscopy since     Skin:  Skin cancer screening: none    Prostate:  Prostate screening 21, nml    Other screening: none    Medical and Surgical History  Pertinent surgical history:   Past Surgical History:   Procedure Laterality Date    CHOLECYSTECTOMY      COLONOSCOPY      KIDNEY SURGERY      NISSEN FUNDOPLICATION        Pertinent medical history:  Past Medical History:   Diagnosis Date    Asthma     Chronic kidney disease     Colon polyp     GERD (gastroesophageal reflux disease)     Renal cancer, right ([address] )          Other History:  Height:   Ht Readings from Last 1 Encounters:   04/15/21 6' 1" (1 854 m)     Weight:   Wt Readings from Last 1 Encounters:   04/15/21 101 kg (222 lb)       Relevant Family History     Reported Ancestry: Maria A Christian has 3 paternal half-siblings that he had contact with  He reports additional half siblings, but does not know their information  His siblings' history includes:   Brother(s): 2  o  (79) history of pancreatic cancer diagnosed at 79  o  (61) no cancer history   Sister(s): 76 no cancer history    Robbie's maternal family history includes:   Mother:  (80) history of colon cancer diagnosed at 78- mets to brain and liver   Torres Bliss reports 21 aunts and uncles, the following individuals are who he recalls to have had a history of cancer   Aunt(s): 2, 76s with history of cancer NOS   Uncle(s): history of bladder cancer   Cousin(s): no history of cancer   Grandmother: , history of bladder cancer   Grandfather: , no information    Robbie's paternal family history includes:   Father:  (80), history of prostate cancer diagnosed at 76   Aunt(s): 3, limited information   Uncle(s): 2, limited information   Cousin(s): limited information   Grandmother: limited information   Grandfather: limited information      Please refer to the scanned pedigree in the Media Tab for a complete family history     *All history is reported as provided by the patient; records are not available for review, except where indicated  Assessment:  We discussed sporadic, familial and hereditary cancer    We also discussed the many factors that influence our risk for cancer such as age, environmental exposures, lifestyle choices and family history  We reviewed the indications suggestive of a hereditary predisposition to cancer  Genetic testing is indicated for Geovani Casillas based on the following criteria: Meets NCCN V1 2020 Testing Criteria for Pancreatic Cancer Susceptibility Genes: Family history of a second-degree relative diagnosed with pancreatic cancer    The risks, benefits, and limitations of genetic testing were reviewed with the patient, as well as genetic discrimination laws, and possible test results (positive, negative, variants of uncertain significance) and their clinical implications  If positive for a mutation, options for managing cancer risk including increased surveillance, chemoprevention, and in some cases prophylactic surgery were discussed  Geovani Casillas was informed that if a hereditary cancer syndrome was identified in him, first degree relatives (parents, siblings, and children) have a chance of also inheriting the condition  Genetic testing would allow for predictive genetic testing in other relatives, who may also be at risk depending on their degree of relation  Plan: Patient decided not to proceed with testing at this time  Summary:     Geovani Casillas would like more time to think about whether he would like to pursue genetic testing  I gave him an informational packed and our contct card should he be interested in the future

## 2022-01-15 DIAGNOSIS — K21.9 GASTROESOPHAGEAL REFLUX DISEASE WITHOUT ESOPHAGITIS: ICD-10-CM

## 2022-01-17 RX ORDER — PANTOPRAZOLE SODIUM 40 MG/1
TABLET, DELAYED RELEASE ORAL
Qty: 240 TABLET | Refills: 3 | Status: SHIPPED | OUTPATIENT
Start: 2022-01-17 | End: 2022-05-28 | Stop reason: SDUPTHER

## 2022-03-16 ENCOUNTER — TELEPHONE (OUTPATIENT)
Dept: GYNECOLOGIC ONCOLOGY | Facility: CLINIC | Age: 62
End: 2022-03-16

## 2022-03-16 NOTE — TELEPHONE ENCOUNTER
Called patient and rescheduled 1 yr f/u appt with Dr Hand Held at [facility] to been seen with Mayela Borrego is rescheduled to 4/25/22 at [facility] and confirmed appt location with patient

## 2022-03-18 ENCOUNTER — OFFICE VISIT (OUTPATIENT)
Dept: INTERNAL MEDICINE CLINIC | Facility: CLINIC | Age: 62
End: 2022-03-18
Payer: COMMERCIAL

## 2022-03-18 VITALS
WEIGHT: 216.6 LBS | HEIGHT: 73 IN | HEART RATE: 75 BPM | RESPIRATION RATE: 16 BRPM | SYSTOLIC BLOOD PRESSURE: 138 MMHG | OXYGEN SATURATION: 97 % | BODY MASS INDEX: 28.71 KG/M2 | DIASTOLIC BLOOD PRESSURE: 94 MMHG | TEMPERATURE: 97.7 F

## 2022-03-18 DIAGNOSIS — M54.42 CHRONIC LEFT-SIDED LOW BACK PAIN WITH BILATERAL SCIATICA: ICD-10-CM

## 2022-03-18 DIAGNOSIS — R31.29 OTHER MICROSCOPIC HEMATURIA: ICD-10-CM

## 2022-03-18 DIAGNOSIS — Z80.42 FAMILY HISTORY OF PROSTATE CANCER IN FATHER: ICD-10-CM

## 2022-03-18 DIAGNOSIS — Z13.6 ENCOUNTER FOR LIPID SCREENING FOR CARDIOVASCULAR DISEASE: ICD-10-CM

## 2022-03-18 DIAGNOSIS — G89.29 CHRONIC LEFT-SIDED LOW BACK PAIN WITH BILATERAL SCIATICA: ICD-10-CM

## 2022-03-18 DIAGNOSIS — Z11.59 ENCOUNTER FOR HEPATITIS C SCREENING TEST FOR LOW RISK PATIENT: ICD-10-CM

## 2022-03-18 DIAGNOSIS — R73.03 PREDIABETES: ICD-10-CM

## 2022-03-18 DIAGNOSIS — Z00.00 ANNUAL PHYSICAL EXAM: Primary | ICD-10-CM

## 2022-03-18 DIAGNOSIS — Z80.0 FAMILY HISTORY OF PANCREATIC CANCER: ICD-10-CM

## 2022-03-18 DIAGNOSIS — I10 ESSENTIAL HYPERTENSION: ICD-10-CM

## 2022-03-18 DIAGNOSIS — Z13.220 ENCOUNTER FOR LIPID SCREENING FOR CARDIOVASCULAR DISEASE: ICD-10-CM

## 2022-03-18 DIAGNOSIS — Z90.5 H/O PARTIAL NEPHRECTOMY: ICD-10-CM

## 2022-03-18 DIAGNOSIS — C64.1 RENAL CELL CARCINOMA OF RIGHT KIDNEY (HCC): ICD-10-CM

## 2022-03-18 DIAGNOSIS — K21.9 GASTROESOPHAGEAL REFLUX DISEASE WITHOUT ESOPHAGITIS: ICD-10-CM

## 2022-03-18 DIAGNOSIS — L85.3 DRY SKIN DERMATITIS: ICD-10-CM

## 2022-03-18 DIAGNOSIS — M54.41 CHRONIC LEFT-SIDED LOW BACK PAIN WITH BILATERAL SCIATICA: ICD-10-CM

## 2022-03-18 LAB — SL AMB POCT HEMOGLOBIN AIC: 5.9 (ref ?–6.5)

## 2022-03-18 PROCEDURE — 99214 OFFICE O/P EST MOD 30 MIN: CPT | Performed by: INTERNAL MEDICINE

## 2022-03-18 PROCEDURE — 99396 PREV VISIT EST AGE 40-64: CPT | Performed by: INTERNAL MEDICINE

## 2022-03-18 PROCEDURE — 83036 HEMOGLOBIN GLYCOSYLATED A1C: CPT | Performed by: INTERNAL MEDICINE

## 2022-03-18 RX ORDER — AMLODIPINE BESYLATE 5 MG/1
5 TABLET ORAL DAILY
Qty: 30 TABLET | Refills: 5 | Status: SHIPPED | OUTPATIENT
Start: 2022-03-18 | End: 2022-04-28 | Stop reason: SDUPTHER

## 2022-03-18 RX ORDER — CLOBETASOL PROPIONATE 0.5 MG/G
OINTMENT TOPICAL 2 TIMES DAILY
Qty: 30 G | Refills: 0 | Status: SHIPPED | OUTPATIENT
Start: 2022-03-18 | End: 2022-04-28

## 2022-03-18 RX ORDER — BUDESONIDE AND FORMOTEROL FUMARATE DIHYDRATE 160; 4.5 UG/1; UG/1
2 AEROSOL RESPIRATORY (INHALATION)
COMMUNITY
End: 2022-03-18 | Stop reason: SDUPTHER

## 2022-03-18 NOTE — PROGRESS NOTES
[address] [facility] INTERNAL MEDICINE [place]    NAME: Thao Vázquez  AGE: 62 y o  SEX: male  : 1960     DATE: 3/18/2022     Assessment and Plan:     Problem List Items Addressed This Visit     None      Visit Diagnoses     Annual physical exam    -  Primary          Immunizations and preventive care screenings were discussed with patient today  Appropriate education was printed on patient's after visit summary  Counseling:  Alcohol/drug use: discussed moderation in alcohol intake, the recommendations for healthy alcohol use, and avoidance of illicit drug use  6-8 oz and 3-4 beers  Dental Health: discussed importance of regular tooth brushing, flossing, and dental visits  Injury prevention: discussed safety/seat belts, safety helmets, smoke detectors, carbon dioxide detectors, and smoking near bedding or upholstery  Sexual health: discussed sexually transmitted diseases, partner selection, use of condoms, avoidance of unintended pregnancy, and contraceptive alternatives  · Exercise: the importance of regular exercise/physical activity was discussed  Recommend exercise 3-5 times per week for at least 30 minutes  No follow-ups on file  Chief Complaint:     Chief Complaint   Patient presents with    Annual Exam    Referral     Pain Management      History of Present Illness:     Adult Annual Physical   Patient here for a comprehensive physical exam  The patient reports problems - rash, pain  Diet and Physical Activity  · Diet/Nutrition: poor diet and frequent junk food  · Exercise: no formal exercise  Depression Screening  PHQ-2/9 Depression Screening    Little interest or pleasure in doing things: 0 - not at all  Feeling down, depressed, or hopeless: 0 - not at all  PHQ-2 Score: 0  PHQ-2 Interpretation: Negative depression screen       General Health  · Sleep: sleeps poorly     · Hearing: normal - bilateral   · Vision: no vision problems  · Dental: no dental visits for >1 year          Health  · Symptoms include: none     Review of Systems:     Review of Systems   Past Medical History:     Past Medical History:   Diagnosis Date    Asthma     Chronic kidney disease     Colon polyp     GERD (gastroesophageal reflux disease)     Renal cancer, right (HCC)       Past Surgical History:     Past Surgical History:   Procedure Laterality Date    CHOLECYSTECTOMY      COLONOSCOPY      KIDNEY SURGERY      NISSEN FUNDOPLICATION        Family History:     Family History   Problem Relation Age of Onset   Gauri Maria Colon cancer Mother     Prostate cancer Father       Social History:     Social History     Socioeconomic History    Marital status: /Civil Union     Spouse name: None    Number of children: None    Years of education: None    Highest education level: None   Occupational History    None   Tobacco Use    Smoking status: Never Smoker    Smokeless tobacco: Never Used   Vaping Use    Vaping Use: Never used   Substance and Sexual Activity    Alcohol use: Yes     Comment: social    Drug use: Never    Sexual activity: None   Other Topics Concern    None   Social History Narrative    None     Social Determinants of Health     Financial Resource Strain: Not on file   Food Insecurity: Not on file   Transportation Needs: Not on file   Physical Activity: Not on file   Stress: Not on file   Social Connections: Not on file   Intimate Partner Violence: Not on file   Housing Stability: Not on file      Current Medications:     Current Outpatient Medications   Medication Sig Dispense Refill    albuterol (PROVENTIL HFA,VENTOLIN HFA) 90 mcg/act inhaler Inhale 2 puffs every 6 (six) hours as needed      azelastine (ASTELIN) 0 1 % nasal spray 1 spray into each nostril 2 (two) times a day Use in each nostril as directed      Blood Pressure Monitoring (Blood Pressure Monitor 3) JEANNINE USE 2 (TWO) TIMES A DAY 1 Device 0    budesonide-formoterol (SYMBICORT) 160-4 5 mcg/act inhaler Inhale 2 puffs 2 (two) times a day Rinse mouth after use   fluticasone (FLONASE) 50 mcg/act nasal spray 2 sprays into each nostril daily      loratadine (CLARITIN) 10 mg tablet Take 10 mg by mouth daily      pantoprazole (PROTONIX) 40 mg tablet TAKE 1 TABLET BY MOUTH EVERY  tablet 3    budesonide-formoterol (Symbicort) 160-4 5 mcg/act inhaler Inhale 2 puffs (Patient not taking: Reported on 3/18/2022 )      esomeprazole (NexIUM) 40 MG capsule Take 1 capsule (40 mg total) by mouth daily (Patient not taking: Reported on 4/15/2021) 90 capsule 3     No current facility-administered medications for this visit        Allergies:     No Known Allergies   Physical Exam:     /94 (BP Location: Right arm, Patient Position: Sitting, Cuff Size: Large)   Pulse 75   Temp 97 7 °F (36 5 °C) (Tympanic)   Resp 16   Ht 6' 1" (1 854 m)   Wt 98 2 kg (216 lb 9 6 oz)   SpO2 97%   BMI 28 58 kg/m²     Physical Exam     Rahul Watson MD  [facility] INTERNAL MEDICINE [address] [facility]

## 2022-03-18 NOTE — PATIENT INSTRUCTIONS

## 2022-03-18 NOTE — PROGRESS NOTES
Assessment/Plan:      Quality Measures:     BMI Counseling: Body mass index is 28 58 kg/m²  The BMI is above normal  Nutrition recommendations include decreasing portion sizes and encouraging healthy choices of fruits and vegetables  Exercise recommendations include moderate physical activity 150 minutes/week  Rationale for BMI follow-up plan is due to patient being overweight or obese  Depression Screening and Follow-up Plan: Patient was screened for depression during today's encounter  They screened negative with a PHQ-2 score of 0  Return in about 4 weeks (around 4/15/2022)  No problem-specific Assessment & Plan notes found for this encounter  Diagnoses and all orders for this visit:    Annual physical exam    Gastroesophageal reflux disease without esophagitis    Essential hypertension  -     CBC and differential; Future  -     Comprehensive metabolic panel; Future  -     amLODIPine (NORVASC) 5 mg tablet; Take 1 tablet (5 mg total) by mouth daily    Renal cell carcinoma of right kidney (HCC)    H/O partial nephrectomy    Dry skin dermatitis  -     clobetasol (TEMOVATE) 0 05 % ointment; Apply topically 2 (two) times a day    Prediabetes  -     POCT hemoglobin A1c  -     Hemoglobin A1C; Future    Encounter for hepatitis C screening test for low risk patient  -     Hepatitis C antibody; Future    Encounter for lipid screening for cardiovascular disease  -     Lipid Panel with Direct LDL reflex; Future    Family history of pancreatic cancer    Family history of prostate cancer in father  -     PSA, total and free; Future    Chronic left-sided low back pain with bilateral sciatica  -     Ambulatory Referral to Pain Management; Future    Other microscopic hematuria    Other orders  -     Discontinue: budesonide-formoterol (Symbicort) 160-4 5 mcg/act inhaler; Inhale 2 puffs (Patient not taking: Reported on 3/18/2022 )          Subjective:      Patient ID: Marilyn Chavez is a 62 y o  male      Yeny De La Rsoa is here for annual physical but we discovered he has not been seen for a number of months and we need to discuss his chronic medical problems  He has agreed to this additional visit  He has a PMH for CKD r/t partial nephrectomy 2/2 h/o kidney cancer for which he follows with urology  No issues on that end  H/o uncontrolled BP that we have been watching since last visit  He has agreed to starting amlodipine  H/o chronic back pain and he would like to see pain management  Rash to right lower leg  Appears as a lesion maybe psoriasis  Will order a steroid cream     Labs were done and reviewed  We will recheck his urine for trace hematuria that was found incidentally on ua screen  Has been drinking too much- 6-8 oz of liquor and 3-4 beers  Discussed cutting down  H/o Barretts for which he follows with GI  daily PPI        ALLERGIES:  No Known Allergies    CURRENT MEDICATIONS:    Current Outpatient Medications:     albuterol (PROVENTIL HFA,VENTOLIN HFA) 90 mcg/act inhaler, Inhale 2 puffs every 6 (six) hours as needed, Disp: , Rfl:     azelastine (ASTELIN) 0 1 % nasal spray, 1 spray into each nostril 2 (two) times a day Use in each nostril as directed, Disp: , Rfl:     Blood Pressure Monitoring (Blood Pressure Monitor 3) JEANNINE, USE 2 (TWO) TIMES A DAY, Disp: 1 Device, Rfl: 0    budesonide-formoterol (SYMBICORT) 160-4 5 mcg/act inhaler, Inhale 2 puffs 2 (two) times a day Rinse mouth after use , Disp: , Rfl:     fluticasone (FLONASE) 50 mcg/act nasal spray, 2 sprays into each nostril daily, Disp: , Rfl:     loratadine (CLARITIN) 10 mg tablet, Take 10 mg by mouth daily, Disp: , Rfl:     pantoprazole (PROTONIX) 40 mg tablet, TAKE 1 TABLET BY MOUTH EVERY DAY, Disp: 240 tablet, Rfl: 3    amLODIPine (NORVASC) 5 mg tablet, Take 1 tablet (5 mg total) by mouth daily, Disp: 30 tablet, Rfl: 5    clobetasol (TEMOVATE) 0 05 % ointment, Apply topically 2 (two) times a day, Disp: 30 g, Rfl: 0    ACTIVE PROBLEM LIST:  Patient Active Problem List   Diagnosis    Gastroesophageal reflux disease without esophagitis    History of colonic polyps    Kidney disease    Adrenal adenoma, right    Pancreatic cyst    Family history of pancreatic cancer    Renal cell carcinoma of right kidney ([facility] )    H/O partial nephrectomy    Essential hypertension    Mixed hyperlipidemia       PAST MEDICAL HISTORY:  Past Medical History:   Diagnosis Date    Asthma     Chronic kidney disease     Colon polyp     GERD (gastroesophageal reflux disease)     Renal cancer, right ([facility] )        PAST SURGICAL HISTORY:  Past Surgical History:   Procedure Laterality Date    CHOLECYSTECTOMY      COLONOSCOPY      KIDNEY SURGERY      NISSEN FUNDOPLICATION         FAMILY HISTORY:  Family History   Problem Relation Age of Onset    Colon cancer Mother     Prostate cancer Father        SOCIAL HISTORY:  Social History     Socioeconomic History    Marital status: /Civil Union     Spouse name: Not on file    Number of children: Not on file    Years of education: Not on file    Highest education level: Not on file   Occupational History    Not on file   Tobacco Use    Smoking status: Never Smoker    Smokeless tobacco: Never Used   Vaping Use    Vaping Use: Never used   Substance and Sexual Activity    Alcohol use: Yes     Comment: social    Drug use: Never    Sexual activity: Not on file   Other Topics Concern    Not on file   Social History Narrative    Not on file     Social Determinants of Health     Financial Resource Strain: Not on file   Food Insecurity: Not on file   Transportation Needs: Not on file   Physical Activity: Not on file   Stress: Not on file   Social Connections: Not on file   Intimate Partner Violence: Not on file   Housing Stability: Not on file       Review of Systems   Musculoskeletal: Positive for arthralgias and back pain  Skin: Positive for color change and rash     All other systems reviewed and are negative  Objective:  Vitals:    03/18/22 0845   BP: 138/94   BP Location: Right arm   Patient Position: Sitting   Cuff Size: Large   Pulse: 75   Resp: 16   Temp: 97 7 °F (36 5 °C)   TempSrc: Tympanic   SpO2: 97%   Weight: 98 2 kg (216 lb 9 6 oz)   Height: 6' 1" (1 854 m)     Body mass index is 28 58 kg/m²  Physical Exam  Vitals and nursing note reviewed  Constitutional:       Appearance: Normal appearance  HENT:      Head: Normocephalic and atraumatic  Nose: Nose normal       Mouth/Throat:      Mouth: Mucous membranes are moist    Cardiovascular:      Rate and Rhythm: Normal rate and regular rhythm  Heart sounds: Normal heart sounds  Pulmonary:      Effort: Pulmonary effort is normal       Breath sounds: Normal breath sounds  Abdominal:      General: Bowel sounds are normal       Palpations: Abdomen is soft  Musculoskeletal:         General: Normal range of motion  Cervical back: Normal range of motion  Right lower leg: No edema  Left lower leg: No edema  Skin:     General: Skin is warm and dry  Findings: Rash present  Neurological:      General: No focal deficit present  Mental Status: He is alert and oriented to person, place, and time  Mental status is at baseline  Psychiatric:         Mood and Affect: Mood normal            RESULTS:    Recent Results (from the past 1008 hour(s))   POCT hemoglobin A1c    Collection Time: 03/18/22  9:15 AM   Result Value Ref Range    Hemoglobin A1C 5 9 6 5       This note was created with voice recognition software  Phonic, grammatical and spelling errors may be present within the note as a result

## 2022-03-28 ENCOUNTER — TELEPHONE (OUTPATIENT)
Dept: INTERNAL MEDICINE CLINIC | Facility: CLINIC | Age: 62
End: 2022-03-28

## 2022-03-28 NOTE — TELEPHONE ENCOUNTER
Patient did not hear from pain management yet  He would like to know who you would like him to see?     Call back #[phone]

## 2022-03-30 NOTE — TELEPHONE ENCOUNTER
Dr Kerry Ross recommended Dr Shasta Ramon  I tried calling the patient to make him aware, but no answer and the voicemail wasn't set up  I faxed a referral over to Dr Elizabeth Rosales office

## 2022-04-19 ENCOUNTER — APPOINTMENT (OUTPATIENT)
Dept: RADIOLOGY | Facility: CLINIC | Age: 62
End: 2022-04-19
Payer: COMMERCIAL

## 2022-04-19 ENCOUNTER — CONSULT (OUTPATIENT)
Dept: PAIN MEDICINE | Facility: CLINIC | Age: 62
End: 2022-04-19
Payer: COMMERCIAL

## 2022-04-19 VITALS
DIASTOLIC BLOOD PRESSURE: 90 MMHG | HEART RATE: 63 BPM | BODY MASS INDEX: 28.63 KG/M2 | SYSTOLIC BLOOD PRESSURE: 133 MMHG | HEIGHT: 73 IN | WEIGHT: 216 LBS | RESPIRATION RATE: 18 BRPM

## 2022-04-19 DIAGNOSIS — M54.50 CHRONIC RIGHT-SIDED LOW BACK PAIN, UNSPECIFIED WHETHER SCIATICA PRESENT: ICD-10-CM

## 2022-04-19 DIAGNOSIS — G89.29 CHRONIC RIGHT-SIDED LOW BACK PAIN, UNSPECIFIED WHETHER SCIATICA PRESENT: ICD-10-CM

## 2022-04-19 DIAGNOSIS — S39.012A STRAIN OF LUMBAR REGION, INITIAL ENCOUNTER: Primary | ICD-10-CM

## 2022-04-19 PROCEDURE — 99244 OFF/OP CNSLTJ NEW/EST MOD 40: CPT | Performed by: STUDENT IN AN ORGANIZED HEALTH CARE EDUCATION/TRAINING PROGRAM

## 2022-04-19 PROCEDURE — 72100 X-RAY EXAM L-S SPINE 2/3 VWS: CPT

## 2022-04-19 NOTE — PROGRESS NOTES
Assessment  1  Strain of lumbar region, initial encounter    2  Chronic right-sided low back pain, unspecified whether sciatica present        Plan  This is a 59-year-old male who presents to our office with chief complaint of right-sided low back pain  He has had chronic low back pain for many years, which is primarily in the left side of his back  However over the last year it has been primarily in the right side and radiating into the thigh, but not past the knee  He has notable pain with flexion and lateral flexion to the right  Has some mild pain with facet loading on the right as well  May have some facet mediated issues  We will obtain x-ray of the lumbar spine to assess for significant degenerative disease  He may also have a myofascial component to his pain with some element of lumbar strain  I feel that he would benefit from physical therapy for core strengthening which may also help with his low back pain  Referral was provided today  Patient is looking for least invasive approach to help treating his condition  He is looking to see what the underlying cause of his pain is  We will start with x-ray  Advised that he may need MRI in the future after physical therapy if he still has persistent symptoms to evaluate for soft tissue pathology  He would like to avoid injections her medications if possible  Therefore we agree to have him proceed in the least invasive fashion  We will see him back in 6 weeks or sooner if medically necessary following physical therapy  [place] Prescription Drug Monitoring Program report was reviewed and was appropriate     My impressions and treatment recommendations were discussed in detail with the patient who verbalized understanding and had no further questions  Discharge instructions were provided  I personally saw and examined the patient and I agree with the above discussed plan of care      Orders Placed This Encounter   Procedures    Durable Medical Equipment     1 TENS UNIT    X-ray lumbar spine 2 or 3 views     cHRONIC low back pain right side, worsed with flexion  Evaluating for notable degneerative disease     Standing Status:   Future     Standing Expiration Date:   4/19/2026     Scheduling Instructions:      Bring along any outside films relating to this procedure   Ambulatory referral to Physical Therapy     Standing Status:   Future     Standing Expiration Date:   4/19/2023     Referral Priority:   Routine     Referral Type:   Physical Therapy     Referral Reason:   Specialty Services Required     Requested Specialty:   Physical Therapy     Number of Visits Requested:   1     Expiration Date:   4/19/2023     No orders of the defined types were placed in this encounter  History of Present Illness    Referring Provider: Janes James MD    Maria A Ram is a 62 y o  male who presents with a chief complaint of PORT LOWER BACK PAIN ANKLE  THIS IS A CHRONIC ISSUE FOR LAST 20 YEARS  PATIENT IS CURRENTLY RETIRED  Pain began of an undetermined cause  Moderate to severe in intensity  Currently 4/10  Intermittent  During the past month has been worse at all times a day with no typical pattern  Shooting, sharp, pins and needles  Radiates across the lower back  Also has weakness is not bowel incontinence or saddle anesthesia  Reports that he has had intermittent exacerbations of low back pain, primarily left sided which can last for a few weeks  But over the last year has been having more right sided pain that is worse during driving and sitting  When riding motorcycle no signficant pain  Pain the right side is constant but he feels he has to watch himself as it can escalate with sitting, quick flexion movement  Radiates into right thigh area  Pretty active around the house even though he is currently retired  Does not exercise routinely       Increased with lying down, bending, sitting, exercising, coughing, sneezing  No change with standing, walking, relaxation, bowel movement  Patient has had no imaging of the lumbar spine  No recent physical therapy  His past surgical history is notable for partial right nephrectomy due to kidney cancer  Also has history of cholecystectomy  Past medical history includes asthma, GERD, high cholesterol, hypertension, CKD  Moderate relief with heat/ice therapy and chiropractic manipulation  Last time he saw chiropractor was 4-5 years ago  Does not smoke tobacco marijuana  Drinks alcohol socially  Blood thinners  Not allergic to latex or contrast dye  In the past has used Tylenol ibuprofen  Otherwise no other current pain medications  Feels that his pain is gradually worsening  I have personally reviewed and/or updated the patient's past medical history, past surgical history, family history, social history, current medications, allergies, and vital signs today  Review of Systems   Constitutional: Negative for fever and unexpected weight change  HENT: Negative for trouble swallowing  Eyes: Negative for visual disturbance  Respiratory: Negative for shortness of breath and wheezing  Cardiovascular: Negative for chest pain and palpitations  Gastrointestinal: Negative for constipation, diarrhea, nausea and vomiting  Endocrine: Positive for polyuria  Negative for cold intolerance, heat intolerance and polydipsia  Genitourinary: Negative for difficulty urinating and frequency  Musculoskeletal: Positive for arthralgias and myalgias  Negative for gait problem and joint swelling  Skin: Negative for rash  Neurological: Negative for dizziness, seizures, syncope, weakness and headaches  Hematological: Does not bruise/bleed easily  Psychiatric/Behavioral: Negative for dysphoric mood  All other systems reviewed and are negative        Patient Active Problem List   Diagnosis    Gastroesophageal reflux disease without esophagitis    History of colonic polyps    Kidney disease    Adrenal adenoma, right    Pancreatic cyst    Family history of pancreatic cancer    [facility]    H/O partial nephrectomy    Essential hypertension    Mixed hyperlipidemia       Past Medical History:   Diagnosis Date    Asthma     Chronic kidney disease     Colon polyp     GERD (gastroesophageal reflux disease)     Renal cancer, right ([facility] )        Past Surgical History:   Procedure Laterality Date    CHOLECYSTECTOMY      COLONOSCOPY      KIDNEY SURGERY      NISSEN FUNDOPLICATION         Family History   Problem Relation Age of Onset    Colon cancer Mother     Prostate cancer Father        Social History     Occupational History    Not on file   Tobacco Use    Smoking status: Never Smoker    Smokeless tobacco: Never Used   Vaping Use    Vaping Use: Never used   Substance and Sexual Activity    Alcohol use: Yes     Comment: social    Drug use: Never    Sexual activity: Not on file       Current Outpatient Medications on File Prior to Visit   Medication Sig    albuterol (PROVENTIL HFA,VENTOLIN HFA) 90 mcg/act inhaler Inhale 2 puffs every 6 (six) hours as needed    amLODIPine (NORVASC) 5 mg tablet Take 1 tablet (5 mg total) by mouth daily    azelastine (ASTELIN) 0 1 % nasal spray 1 spray into each nostril 2 (two) times a day Use in each nostril as directed    Blood Pressure Monitoring (Blood Pressure Monitor 3) JEANNINE USE 2 (TWO) TIMES A DAY    budesonide-formoterol (SYMBICORT) 160-4 5 mcg/act inhaler Inhale 2 puffs 2 (two) times a day Rinse mouth after use      clobetasol (TEMOVATE) 0 05 % ointment Apply topically 2 (two) times a day    fluticasone (FLONASE) 50 mcg/act nasal spray 2 sprays into each nostril daily    loratadine (CLARITIN) 10 mg tablet Take 10 mg by mouth daily    pantoprazole (PROTONIX) 40 mg tablet TAKE 1 TABLET BY MOUTH EVERY DAY     No current facility-administered medications on file prior to visit  No Known Allergies    Physical Exam    /90   Pulse 63   Resp 18   Ht 6' 1" (1 854 m)   Wt 98 kg (216 lb)   BMI 28 50 kg/m²     Constitutional: normal, well developed, well nourished, alert, in no distress and non-toxic and no overt pain behavior  Eyes: anicteric  HEENT: grossly intact  Neck: supple, symmetric, trachea midline and no masses   Pulmonary:even and unlabored  Cardiovascular:No edema or pitting edema present  Skin:Normal without rashes or lesions and well hydrated  Psychiatric:Mood and affect appropriate  Neurologic:Cranial Nerves II-XII grossly intact  Musculoskeletal:normal     Lumbar Spine Exam    Appearance:  Normal lordosis  Palpation/Tenderness:  right lumbar paraspinal tenderness  right sacroiliac joint tenderness  Sensory:  no sensory deficits noted  Range of Motion:  Flexion: Moderately limited  with pain  Extension:  No limitation  without pain  Lateral Flexion - Left:  No limitation  without pain  Lateral Flexion - Right:  Minimally limited  with pain  Rotation - Left:  No limitation  without pain  Rotation - Right:  No limitation  without pain  Motor Strength:  Left hip flexion:  5/5  Left hip extension:  5/5  Right hip flexion:  5/5  Right hip extension:  5/5  Left knee flexion:  5/5  Left knee extension:  5/5  Right knee flexion:  5/5  Right knee extension:  5/5  Left foot dorsiflexion:  5/5  Left foot plantar flexion:  5/5  Right foot dorsiflexion:  5/5  Right foot plantar flexion:  5/5   Notable pain in right side of lumbar spine with resisted hip flexion     Reflexes:  Left Patellar:  2+   Right Patellar:  2+   Left Achilles:  2+   Right Achilles:  2+   Special Tests:  Left Straight Leg Test:  negative  Right Straight Leg Test:  negative  Left Chapo's Maneuver:  negative  Right Chapo's Maneuver:  negative   Notable pain in right lumbar spine with right SLR      DIAGNOSTIC IMAGING AND TEST RESULTS:  No lumbar imaging

## 2022-04-19 NOTE — PATIENT INSTRUCTIONS
How to Use a TENS Unit   WHAT YOU NEED TO KNOW:   What do I need to know about a TENS unit? A transcutaneous electrical nerve stimulation (TENS) unit is a treatment for pain  A TENS unit is a small, portable, battery-powered device  The TENS unit uses mild, safe electrical signals to help control pain  Electrodes (sticky patches) are placed on your skin  The TENS unit sends painless electrical signals through the electrodes to the nerves under your skin  Electrode placement depends on the type and location of your pain  Your healthcare provider will show you where to place the electrodes and what settings are best for you  How do I use a TENS unit? · Test the battery pack of the TENS unit to make sure it is fully charged  The TENS unit has 2 control knobs  One control knob makes the electrical signals strong or weak  The other control knob makes the electrical signals fast or slow  Turn the control knobs to the off position before you start  · Use rubbing alcohol to clean the skin where the electrodes will be placed  Let your skin dry  · Put a thin coat of gel on the bottom of each electrode  This gel helps the electrical signal get to the nerves under your skin  · Put the electrodes on your skin and use medical tape or a sticky patch to cover the electrode  This keeps the electrode firmly stuck to the skin  Ask for help if you cannot reach the area where the electrodes should go  · Hook the pin connectors on the end of the electrode wires to the electrodes  Then plug the electrode wires into the TENS unit  · Turn the control knobs slowly to the correct setting  You should feel a tingling feeling  · Hook the TENS unit to your belt or place it in a pocket  What should I do after the TENS treatment? · Turn the control knobs to the off position  Unplug the electrode wires from the TENS unit  · The electrodes may be left on your skin if you have another TENS treatment soon   If not, remove the electrodes  Wash the skin where the electrodes were placed  Clean the electrodes with soap and water to remove the gel  Do not use alcohol because this can damage the rubber on the electrode  Get new electrodes if the electrodes become damaged or will not stay stuck to the skin  · Remove the battery from the TENS and replace it with a charged battery  Charge the battery so that it will be ready for another treatment  What else do I need to know about a TENS unit? · Tell your healthcare provider if your muscles start to twitch during treatment  The TENS signals may be too strong or too fast  Also tell him if you cannot feel any tingling at all  The signal may be too weak or too slow  · The electrodes should be removed at least once a day if the TENS treatment is used around the clock  Check your skin under the electrodes for redness or tenderness  Clean your skin when the electrodes are off and use lotion  Move the electrodes slightly for each treatment  This will help prevent the skin from becoming red or sore  Put new gel on the bottom of the electrodes each time you place them on your skin  · Do not sleep or get near water with the electrodes on your skin and the TENS unit turned on  CARE AGREEMENT:   You have the right to help plan your care  Learn about your health condition and how it may be treated  Discuss treatment options with your healthcare providers to decide what care you want to receive  You always have the right to refuse treatment  The above information is an  only  It is not intended as medical advice for individual conditions or treatments  Talk to your doctor, nurse or pharmacist before following any medical regimen to see if it is safe and effective for you  © Copyright [employer] 2022 Information is for End User's use only and may not be sold, redistributed or otherwise used for commercial purposes   All illustrations and images included in CareNotes® are the copyrighted property of [employer]  or [address] [place], [place] [address] [facility] [address]

## 2022-04-21 ENCOUNTER — HOSPITAL ENCOUNTER (OUTPATIENT)
Dept: RADIOLOGY | Facility: HOSPITAL | Age: 62
Discharge: HOME | End: 2022-04-21
Payer: COMMERCIAL

## 2022-04-21 DIAGNOSIS — K86.2 PANCREATIC CYST: ICD-10-CM

## 2022-04-21 PROCEDURE — A9585 GADOBUTROL INJECTION: HCPCS | Performed by: SURGERY

## 2022-04-21 PROCEDURE — G1004 CDSM NDSC: HCPCS

## 2022-04-21 PROCEDURE — 76376 3D RENDER W/INTRP POSTPROCES: CPT

## 2022-04-21 PROCEDURE — 74183 MRI ABD W/O CNTR FLWD CNTR: CPT

## 2022-04-21 RX ADMIN — GADOBUTROL 10 ML: 604.72 INJECTION INTRAVENOUS at 16:20

## 2022-04-22 ENCOUNTER — TELEPHONE (OUTPATIENT)
Dept: RADIOLOGY | Facility: CLINIC | Age: 62
End: 2022-04-22

## 2022-04-22 NOTE — TELEPHONE ENCOUNTER
----- Message from Miguel Garza MD sent at 4/22/2022  9:33 AM EDT -----  X-ray shows mild degenerative changes in the lumbar spine  No fractures or misalignments    Continue with PT

## 2022-04-25 ENCOUNTER — OFFICE VISIT (OUTPATIENT)
Dept: SURGICAL ONCOLOGY | Facility: CLINIC | Age: 62
End: 2022-04-25
Payer: COMMERCIAL

## 2022-04-25 ENCOUNTER — TELEPHONE (OUTPATIENT)
Dept: GASTROENTEROLOGY | Facility: CLINIC | Age: 62
End: 2022-04-25

## 2022-04-25 VITALS
SYSTOLIC BLOOD PRESSURE: 122 MMHG | OXYGEN SATURATION: 98 % | BODY MASS INDEX: 28.36 KG/M2 | HEIGHT: 73 IN | WEIGHT: 214 LBS | RESPIRATION RATE: 14 BRPM | TEMPERATURE: 96.7 F | DIASTOLIC BLOOD PRESSURE: 80 MMHG | HEART RATE: 75 BPM

## 2022-04-25 DIAGNOSIS — K22.719 BARRETT'S ESOPHAGUS WITH DYSPLASIA: ICD-10-CM

## 2022-04-25 DIAGNOSIS — Z80.0 FAMILY HISTORY OF PANCREATIC CANCER: ICD-10-CM

## 2022-04-25 DIAGNOSIS — K86.2 PANCREATIC CYST: Primary | ICD-10-CM

## 2022-04-25 DIAGNOSIS — Z80.0 FAMILY HX OF COLON CANCER: ICD-10-CM

## 2022-04-25 DIAGNOSIS — R13.10 DYSPHAGIA, UNSPECIFIED TYPE: ICD-10-CM

## 2022-04-25 PROCEDURE — 99214 OFFICE O/P EST MOD 30 MIN: CPT

## 2022-04-25 NOTE — TELEPHONE ENCOUNTER
Dr Moshe Young - Dr Diaz Pitch office called lmom - requested we call patient for office visit  Called patient lmom for patient to call the office  04/26/22 - called patient voice mail not set up

## 2022-04-25 NOTE — PROGRESS NOTES
Surgical Oncology Follow Up       [facility] [place]  [address] [address]  Mary Vazquez  1960  [phone]  [facility] [place]  [address] [facility] [address]  [address] [facility]  [phone]    Diagnoses and all orders for this visit:    Pancreatic cyst  -     MRI abdomen w wo contrast and mrcp; Future  -     BUN; Future  -     Creatinine, serum; Future    Brady's esophagus with dysplasia  -     Ambulatory referral to Gastroenterology; Future    Dysphagia, unspecified type  -     Ambulatory referral to Gastroenterology; Future    Family history of pancreatic cancer    Family hx of colon cancer        Chief Complaint   Patient presents with    Follow-up     1 year-pancreatic cyst        Return in about 1 year (around 4/25/2023) for Office visit with Dr Lona Singh, Imaging - See orders, Labs - See Treatment Plan  History of Present Illness: The patient returns today for continued follow-up of his pancreatic cyst   He does have a family history of pancreatic cancer in a brother  He had seen Medical Genetics last year, but decided not to proceed with testing  He does also have a family history of colon cancer, bladder cancer, and prostate cancer in multiple family members  Today reports he is feeling well  However, he is reporting worsening dysphagia  He states he will have periods with no symptoms, and then will have times where he feels like food is getting stuck, and he brings up foam but does not vomit  He also reports frequent but intermittent periods of nausea  He states it is not any specific time of day, and he also states that his swallowing issues is not with any particular type of food    He has seen gastroenterology in the past, and was shown to have Brady's esophagus with unspecified dysplasia on biopsy  He was supposed to have repeat EGD, but this has not been performed  He also has a moderate hiatal hernia, which was repaired in 2008, but reappeared more recently  The patient denies any weight loss, abdominal pain, change in appetite, or fatigue  He also denies any bloating, diarrhea or jaundice  MRI of the abdomen with MRCP was completed on April 21, 2022, this has been reviewed with the patient and his wife today  Review of Systems   Constitutional: Negative for activity change, appetite change, fatigue and unexpected weight change  HENT: Positive for trouble swallowing  Negative for sore throat and voice change  Eyes: Negative  Respiratory: Negative for cough, chest tightness and shortness of breath  Cardiovascular: Negative for chest pain  Gastrointestinal: Positive for nausea  Negative for abdominal distention, abdominal pain, constipation, diarrhea and vomiting  Endocrine: Negative  Genitourinary: Negative  Musculoskeletal: Negative  Skin: Negative for color change and pallor  Allergic/Immunologic: Negative  Neurological: Negative  Hematological: Negative for adenopathy  Does not bruise/bleed easily  Psychiatric/Behavioral: Negative                Patient Active Problem List   Diagnosis    Gastroesophageal reflux disease without esophagitis    History of colonic polyps    Kidney disease    Adrenal adenoma, right    Pancreatic cyst    Family history of pancreatic cancer    [facility]    H/O partial nephrectomy    Essential hypertension    Mixed hyperlipidemia    Brady's esophagus with dysplasia    Family hx of colon cancer     Past Medical History:   Diagnosis Date    Asthma     Chronic kidney disease     Colon polyp     GERD (gastroesophageal reflux disease)     [facility]      Past Surgical History:   Procedure Laterality Date    CHOLECYSTECTOMY      COLONOSCOPY  KIDNEY SURGERY      NISSEN FUNDOPLICATION       Family History   Problem Relation Age of Onset    Colon cancer Mother 78        metastatic    Prostate cancer Father 76    Cancer Maternal Grandmother         bladder cancer    Cancer Maternal Uncle         bladder cancer    Pancreatic cancer Half-Brother 79     Social History     Socioeconomic History    Marital status: /Civil Union     Spouse name: Not on file    Number of children: Not on file    Years of education: Not on file    Highest education level: Not on file   Occupational History    Not on file   Tobacco Use    Smoking status: Never Smoker    Smokeless tobacco: Never Used   Vaping Use    Vaping Use: Never used   Substance and Sexual Activity    Alcohol use: Yes     Comment: social    Drug use: Never    Sexual activity: Yes   Other Topics Concern    Not on file   Social History Narrative    Not on file     Social Determinants of Health     Financial Resource Strain: Not on file   Food Insecurity: Not on file   Transportation Needs: Not on file   Physical Activity: Not on file   Stress: Not on file   Social Connections: Not on file   Intimate Partner Violence: Not on file   Housing Stability: Not on file       Current Outpatient Medications:     albuterol (PROVENTIL HFA,VENTOLIN HFA) 90 mcg/act inhaler, Inhale 2 puffs every 6 (six) hours as needed, Disp: , Rfl:     amLODIPine (NORVASC) 5 mg tablet, Take 1 tablet (5 mg total) by mouth daily, Disp: 30 tablet, Rfl: 5    azelastine (ASTELIN) 0 1 % nasal spray, 1 spray into each nostril 2 (two) times a day Use in each nostril as directed, Disp: , Rfl:     Blood Pressure Monitoring (Blood Pressure Monitor 3) JEANNINE, USE 2 (TWO) TIMES A DAY, Disp: 1 Device, Rfl: 0    budesonide-formoterol (SYMBICORT) 160-4 5 mcg/act inhaler, Inhale 2 puffs 2 (two) times a day Rinse mouth after use , Disp: , Rfl:     fluticasone (FLONASE) 50 mcg/act nasal spray, 2 sprays into each nostril daily, Disp: , Rfl:     loratadine (CLARITIN) 10 mg tablet, Take 10 mg by mouth daily, Disp: , Rfl:     pantoprazole (PROTONIX) 40 mg tablet, TAKE 1 TABLET BY MOUTH EVERY DAY, Disp: 240 tablet, Rfl: 3    clobetasol (TEMOVATE) 0 05 % ointment, Apply topically 2 (two) times a day (Patient not taking: Reported on 4/25/2022 ), Disp: 30 g, Rfl: 0  No Known Allergies  Vitals:    04/25/22 0852   BP: 122/80   Pulse: 75   Resp: 14   Temp: (!) 96 7 °F (35 9 °C)   SpO2: 98%       Physical Exam  Vitals reviewed  Constitutional:       General: He is not in acute distress  Appearance: Normal appearance  He is not ill-appearing  HENT:      Head: Normocephalic and atraumatic  Nose: Nose normal       Mouth/Throat:      Mouth: Mucous membranes are moist       Pharynx: Oropharynx is clear  Eyes:      General: No scleral icterus  Extraocular Movements: Extraocular movements intact  Conjunctiva/sclera: Conjunctivae normal       Pupils: Pupils are equal, round, and reactive to light  Cardiovascular:      Rate and Rhythm: Normal rate and regular rhythm  Heart sounds: Normal heart sounds  Pulmonary:      Effort: Pulmonary effort is normal       Breath sounds: Normal breath sounds  Abdominal:      General: Abdomen is flat  There is no distension  Palpations: Abdomen is soft  There is no mass  Tenderness: There is no abdominal tenderness  Musculoskeletal:         General: Normal range of motion  Cervical back: Normal range of motion and neck supple  Lymphadenopathy:      Cervical: No cervical adenopathy  Skin:     General: Skin is warm and dry  Coloration: Skin is not jaundiced or pale  Neurological:      General: No focal deficit present  Mental Status: He is alert and oriented to person, place, and time     Psychiatric:         Mood and Affect: Mood normal          Behavior: Behavior normal            Imaging    MRI abdomen w wo contrast and mrcp    Result Date: 4/22/2022  Narrative: MRI OF THE ABDOMEN WITH AND WITHOUT CONTRAST WITH MRCP INDICATION: 62 years / Male  K86 2: Cyst of pancreas  COMPARISON: MRI abdomen 4/13/2021 TECHNIQUE:  The following pulse sequences were obtained:  Coronal and axial T2 with TE of 90 and 180 respectively, axial T2 with fat saturation, axial FIESTA fat-sat, axial T1-weighted in-and-out-of phase, axial DWI/ADC, pre-contrast axial T1 with fat saturation, post-contrast dynamic axial T1 with fat saturation at 20, 70, and 180 seconds, followed by coronal and 7 minute delayed axial T1 with fat saturation  3D MRCP images were obtained with radial thick slabs and projections  3D rendering was performed from the acquisition scanner  IV Contrast:  10 mL of Gadobutrol injection (SINGLE-DOSE) FINDINGS: LOWER CHEST:   Moderate hiatal hernia  LIVER: Normal in size and configuration  No suspicious mass  The hepatic veins and portal veins are patent  BILE DUCTS:  No intrahepatic or extrahepatic bile duct dilation  Common bile duct is normal in caliber  No choledocholithiasis, biliary stricture or suspicious mass  GALLBLADDER:  Absent PANCREAS:  Unchanged 18 x 11 mm pancreatic neck cyst # 10/64  No new pancreatic findings  ADRENAL GLANDS:  Normal  SPLEEN:  Normal  KIDNEYS/PROXIMAL URETERS:  No hydroureteronephrosis  No suspicious renal mass  BOWEL:   No dilated loops of bowel  PERITONEUM/RETROPERITONEUM:  No ascites  LYMPH NODES:  No abdominal lymphadenopathy  VASCULAR STRUCTURES:  No aneurysm  ABDOMINAL WALL:  Unremarkable  OSSEOUS STRUCTURES:  No suspicious osseous lesion  Impression: Unchanged 18 x 11 mm pancreatic neck cyst # 10/64  Follow-up with pancreatic MRI in one year  Unchanged moderate hiatal hernia Workstation performed: [id number]     I reviewed the above imaging data  Discussion/Summary:   This is a pleasant 58-year-old male who presents to the office today for continued surveillance of his pancreatic cyst   Currently at 1 8 cm, it is unchanged from 1 year ago  Although he does have a family history of pancreatic cancer, he is not interested in pursuing medical genetics at this time  I discussed this with him again today, and explained the importance of genetic testing, and the role it plays in his care  I explained that if he is positive for a genetic mutation, we would perform surveillance imaging at more frequent intervals, and he would likely need endoscopic surveillance as well  I briefly discussed the role of endoscopic ultrasound with the patient  Again, the patient is refusing genetic testing at this time  I have encouraged him to go home and discuss this with his wife and family  At this time, since the cyst has remained unchanged in size and appearance, we will survey with cross-sectional imaging alone  With regard to his symptoms of dysphagia, I feel that he does need repeat EGD at this time  I explained that the biopsies from his EGD performed in 2021 showed Barretts with indeterminate dysplasia, and repeat EGD was recommended but not performed  I explained that I feel is important that he has this repeated soon, as he is at an increased risk for esophageal cancer  I will ask our staff to help coordinate an appointment with Dr Tali Trujillo in the near future  The patient will return for a clinical exam in 1 year with Dr Tianna Milner following repeat MRI with MRCP  We will monitor for notes from Gastroenterology regarding repeat upper endoscopy  Patient is agreeable to plan, all questions have been answered

## 2022-04-25 NOTE — LETTER
April 25, 2022     Employee: Gabriel Miller  Patient: Catina Wilburn  YOB: 1960  Date of Visit: 4/25/2022      To Whom it May Concern: Catina Wilburn is under my professional care  Scot Alexander was seen in my office on 4/25/2022  He was accompanied by his wife, Gabriel Miller  Please excuse her from work for this medically necessary appointment  If you have any questions or concerns, please don't hesitate to call           Sincerely,          DARÍO Robledo        CC: No Recipients

## 2022-04-28 ENCOUNTER — OFFICE VISIT (OUTPATIENT)
Dept: GASTROENTEROLOGY | Facility: CLINIC | Age: 62
End: 2022-04-28
Payer: COMMERCIAL

## 2022-04-28 ENCOUNTER — OFFICE VISIT (OUTPATIENT)
Dept: INTERNAL MEDICINE CLINIC | Facility: CLINIC | Age: 62
End: 2022-04-28
Payer: COMMERCIAL

## 2022-04-28 VITALS
HEIGHT: 73 IN | WEIGHT: 218.8 LBS | BODY MASS INDEX: 29 KG/M2 | SYSTOLIC BLOOD PRESSURE: 126 MMHG | DIASTOLIC BLOOD PRESSURE: 82 MMHG | HEART RATE: 78 BPM

## 2022-04-28 VITALS
BODY MASS INDEX: 28.63 KG/M2 | OXYGEN SATURATION: 95 % | SYSTOLIC BLOOD PRESSURE: 144 MMHG | WEIGHT: 216 LBS | TEMPERATURE: 97.6 F | DIASTOLIC BLOOD PRESSURE: 90 MMHG | HEIGHT: 73 IN | HEART RATE: 86 BPM

## 2022-04-28 DIAGNOSIS — I10 ESSENTIAL HYPERTENSION: ICD-10-CM

## 2022-04-28 DIAGNOSIS — K22.719 BARRETT'S ESOPHAGUS WITH DYSPLASIA: ICD-10-CM

## 2022-04-28 DIAGNOSIS — K86.2 PANCREATIC CYST: Primary | ICD-10-CM

## 2022-04-28 DIAGNOSIS — R73.03 PREDIABETES: ICD-10-CM

## 2022-04-28 DIAGNOSIS — R13.10 DYSPHAGIA, UNSPECIFIED TYPE: ICD-10-CM

## 2022-04-28 DIAGNOSIS — E78.2 MIXED HYPERLIPIDEMIA: ICD-10-CM

## 2022-04-28 DIAGNOSIS — R31.9 HEMATURIA, UNSPECIFIED TYPE: ICD-10-CM

## 2022-04-28 DIAGNOSIS — J45.20 MILD INTERMITTENT ASTHMA WITHOUT COMPLICATION: ICD-10-CM

## 2022-04-28 LAB
SL AMB  POCT GLUCOSE, UA: NEGATIVE
SL AMB LEUKOCYTE ESTERASE,UA: NEGATIVE
SL AMB POCT BILIRUBIN,UA: NEGATIVE
SL AMB POCT BLOOD,UA: NEGATIVE
SL AMB POCT CLARITY,UA: NORMAL
SL AMB POCT COLOR,UA: YELLOW
SL AMB POCT KETONES,UA: NEGATIVE
SL AMB POCT NITRITE,UA: NEGATIVE
SL AMB POCT PH,UA: 6.5
SL AMB POCT SPECIFIC GRAVITY,UA: 1.03
SL AMB POCT URINE PROTEIN: NEGATIVE
SL AMB POCT UROBILINOGEN: 3.2

## 2022-04-28 PROCEDURE — 99214 OFFICE O/P EST MOD 30 MIN: CPT | Performed by: PHYSICIAN ASSISTANT

## 2022-04-28 PROCEDURE — 81002 URINALYSIS NONAUTO W/O SCOPE: CPT | Performed by: INTERNAL MEDICINE

## 2022-04-28 PROCEDURE — 99214 OFFICE O/P EST MOD 30 MIN: CPT | Performed by: INTERNAL MEDICINE

## 2022-04-28 RX ORDER — AMLODIPINE BESYLATE 5 MG/1
10 TABLET ORAL DAILY
Qty: 30 TABLET | Refills: 5
Start: 2022-04-28 | End: 2022-05-19 | Stop reason: SDUPTHER

## 2022-04-28 RX ORDER — BUDESONIDE AND FORMOTEROL FUMARATE DIHYDRATE 160; 4.5 UG/1; UG/1
2 AEROSOL RESPIRATORY (INHALATION) 2 TIMES DAILY
Qty: 10.2 G | Refills: 3 | Status: SHIPPED | OUTPATIENT
Start: 2022-04-28

## 2022-04-28 RX ORDER — LORATADINE 10 MG/1
10 TABLET ORAL DAILY
Qty: 30 TABLET | Refills: 2 | Status: SHIPPED | OUTPATIENT
Start: 2022-04-28 | End: 2022-07-27

## 2022-04-28 RX ORDER — SUCRALFATE ORAL 1 G/10ML
1 SUSPENSION ORAL 4 TIMES DAILY
Qty: 1200 ML | Refills: 0 | Status: SHIPPED | OUTPATIENT
Start: 2022-04-28 | End: 2022-05-28

## 2022-04-28 RX ORDER — ALBUTEROL SULFATE 90 UG/1
2 AEROSOL, METERED RESPIRATORY (INHALATION) EVERY 6 HOURS PRN
Qty: 18 G | Refills: 3 | Status: SHIPPED | OUTPATIENT
Start: 2022-04-28

## 2022-04-28 NOTE — PROGRESS NOTES
[facility]  Amanda Navid 62 y o  male MRN: [id number]       CC:  New onset solid-food dysphagia    HPI: Jonatan Tineo is a pleasant 71-year-old male with history of Brady's esophagus, GERD status post previous hiatal hernia repair, asthma, and hypertension  Patient is here by instruction of Surgical Oncology for consideration of repeat EGD  Patient was following up with them for pancreatic neck cyst seen on MRCP  He follows with them for surveillance  Patient reports that he has been experiencing new onset dysphagia for the past month or 2  He reports that he had 1 episode over the weekend where he had macaroni salad with steak that felt like it got stuck  Eventually, he reports that it did pass and he did not require emergency room evaluation  He also reports heartburn again, which is unusual since the patient was doing well on Protonix prior to this  He has been taking omeprazole and Tums  He was never a smoker  He reports that he used to be heavy drinker  He denies dysphagia to liquids  He denies unintentional weight loss at this time  Patient's last EGD was in January 2021 revealing intact fundoplication and Brady's esophagus  He reports that he has been compliant with taking PPI daily  He reports that omeprazole gave him constipation in the past   Biopsy was indefinite for dysplasia in 2021  His last colonoscopy was in April 2021 status post polyp removal   He is due for recall in 3 years  Review of Systems:    CONSTITUTIONAL: Denies any fever, chills, or rigors  Good appetite, and no recent weight loss  HEENT: No earache or tinnitus  Denies hearing loss or visual disturbances  CARDIOVASCULAR: No chest pain or palpitations  RESPIRATORY: Denies any cough, hemoptysis, shortness of breath or dyspnea on exertion  GASTROINTESTINAL: As noted in the History of Present Illness  GENITOURINARY: No problems with urination  Denies any hematuria or dysuria    NEUROLOGIC: No dizziness or vertigo, denies headaches  MUSCULOSKELETAL: Denies any muscle or joint pain  SKIN: Denies skin rashes or itching  ENDOCRINE: Denies excessive thirst  Denies intolerance to heat or cold  PSYCHOSOCIAL: Denies depression or anxiety  Denies any recent memory loss  Current Outpatient Medications   Medication Sig Dispense Refill    albuterol (PROVENTIL HFA,VENTOLIN HFA) 90 mcg/act inhaler Inhale 2 puffs every 6 (six) hours as needed for wheezing or shortness of breath 18 g 3    amLODIPine (NORVASC) 5 mg tablet Take 2 tablets (10 mg total) by mouth daily 30 tablet 5    azelastine (ASTELIN) 0 1 % nasal spray 1 spray into each nostril 2 (two) times a day Use in each nostril as directed      Blood Pressure Monitoring (Blood Pressure Monitor 3) JEANNINE USE 2 (TWO) TIMES A DAY 1 Device 0    budesonide-formoterol (SYMBICORT) 160-4 5 mcg/act inhaler Inhale 2 puffs 2 (two) times a day Rinse mouth after use  10 2 g 3    fluticasone (FLONASE) 50 mcg/act nasal spray 2 sprays into each nostril daily      loratadine (CLARITIN) 10 mg tablet Take 1 tablet (10 mg total) by mouth daily 30 tablet 2    pantoprazole (PROTONIX) 40 mg tablet TAKE 1 TABLET BY MOUTH EVERY  tablet 3    sucralfate (CARAFATE) 1 g/10 mL suspension Take 10 mL (1 g total) by mouth 4 (four) times a day 1200 mL 0     No current facility-administered medications for this visit       Past Medical History:   Diagnosis Date    Asthma     Chronic kidney disease     Colon polyp     GERD (gastroesophageal reflux disease)     Renal cancer, right (HCC)      Past Surgical History:   Procedure Laterality Date    CHOLECYSTECTOMY      COLONOSCOPY      KIDNEY SURGERY      NISSEN FUNDOPLICATION       Social History     Socioeconomic History    Marital status: /Civil Union     Spouse name: None    Number of children: None    Years of education: None    Highest education level: None   Occupational History    None   Tobacco Use  Smoking status: Never Smoker    Smokeless tobacco: Never Used   Vaping Use    Vaping Use: Never used   Substance and Sexual Activity    Alcohol use: Yes     Comment: social    Drug use: Never    Sexual activity: Yes   Other Topics Concern    None   Social History Narrative    None     Social Determinants of Health     Financial Resource Strain: Not on file   Food Insecurity: Not on file   Transportation Needs: Not on file   Physical Activity: Not on file   Stress: Not on file   Social Connections: Not on file   Intimate Partner Violence: Not on file   Housing Stability: Not on file     Family History   Problem Relation Age of Onset    Colon cancer Mother 78        metastatic    Prostate cancer Father 76    Cancer Maternal Grandmother         bladder cancer    Cancer Maternal Uncle         bladder cancer    Pancreatic cancer Half-Brother 79            PHYSICAL EXAM:    Vitals:    04/28/22 1320   BP: 126/82   Pulse: 78   Weight: 99 2 kg (218 lb 12 8 oz)   Height: 6' 1" (1 854 m)     General Appearance:   Alert and oriented x 3  Cooperative, and in no respiratory distress   HEENT:   Normocephalic, atraumatic, anicteric      Neck:  Supple, symmetrical, trachea midline   Lungs:   Clear to auscultation bilaterally    Heart[de-identified]   Regular rate and rhythm   Abdomen:   Soft, non-tender, non-distended; normal bowel sounds; no masses, no organomegaly    Genitalia:   Deferred    Rectal:   Deferred    Extremities:  No cyanosis, clubbing or edema    Pulses:  2+ and symmetric all extremities    Skin:  Skin color, texture, turgor normal, no rashes or lesions    Lymph nodes:  No palpable cervical or supraclavicular lymphadenopathy        Lab Results:             Invalid input(s): LABALBU            Imaging Studies: I have personally reviewed pertinent imaging studies  MRI abdomen w wo contrast and mrcp    Result Date: 4/22/2022  Impression: Unchanged 18 x 11 mm pancreatic neck cyst # 10/64    Follow-up with pancreatic MRI in one year  Unchanged moderate hiatal hernia Workstation performed: [id number]       ASSESSMENT and PLAN:      1) History of Brady's esophagus, new onset solid food esophageal dysphagia and pyrosis -  Last EGD was over 1 year ago  He has known history of Brady's esophagus  His last EGD biopsy was indefinite for dysplasia  -   EGD to investigate  -  Patient would like to hold off on increasing his PPI for now as he is fearful of constipation  -  Will add Carafate  -  Avoid alcohol and NSAIDs    2) Pancreatic neck cyst -  Monitored by Surgical Oncology  His last MRI was in April this year  He is due for another MRI in 1 year  Appreciate their recommendations  Follow up after EGD

## 2022-04-28 NOTE — PROGRESS NOTES
Assessment/Plan:     Hanane Porras is here for routine follow up  He has been having some dysphagia  Will be seeing GI today  BP is not controlled  Increase amlodipine to 10 mg  Denies any cp, sob  Otherwise doing well  Needs refills on his inhalers  Discussed his recent labs  Quality Measures:       Depression Screening and Follow-up Plan: Patient was screened for depression during today's encounter  They screened negative with a PHQ-2 score of 0  Return in about 6 months (around 10/28/2022)  No problem-specific Assessment & Plan notes found for this encounter  Diagnoses and all orders for this visit:    Pancreatic cyst    Essential hypertension  -     amLODIPine (NORVASC) 5 mg tablet; Take 2 tablets (10 mg total) by mouth daily  -     CBC and differential; Future  -     Comprehensive metabolic panel; Future    Mixed hyperlipidemia  -     Lipid Panel with Direct LDL reflex; Future    Prediabetes  -     Hemoglobin A1C; Future    Mild intermittent asthma without complication  -     loratadine (CLARITIN) 10 mg tablet; Take 1 tablet (10 mg total) by mouth daily  -     budesonide-formoterol (SYMBICORT) 160-4 5 mcg/act inhaler; Inhale 2 puffs 2 (two) times a day Rinse mouth after use  -     albuterol (PROVENTIL HFA,VENTOLIN HFA) 90 mcg/act inhaler; Inhale 2 puffs every 6 (six) hours as needed for wheezing or shortness of breath    Hematuria, unspecified type  -     POCT urine dip  -     PSA, total and free; Future          Subjective:      Patient ID: Baldomero Chavez is a 62 y o  male  Hanane Porras is here for routine follow up        ALLERGIES:  No Known Allergies    CURRENT MEDICATIONS:    Current Outpatient Medications:     albuterol (PROVENTIL HFA,VENTOLIN HFA) 90 mcg/act inhaler, Inhale 2 puffs every 6 (six) hours as needed for wheezing or shortness of breath, Disp: 18 g, Rfl: 3    amLODIPine (NORVASC) 5 mg tablet, Take 2 tablets (10 mg total) by mouth daily, Disp: 30 tablet, Rfl: 5    azelastine (ASTELIN) 0 1 % nasal spray, 1 spray into each nostril 2 (two) times a day Use in each nostril as directed, Disp: , Rfl:     Blood Pressure Monitoring (Blood Pressure Monitor 3) JEANNINE, USE 2 (TWO) TIMES A DAY, Disp: 1 Device, Rfl: 0    budesonide-formoterol (SYMBICORT) 160-4 5 mcg/act inhaler, Inhale 2 puffs 2 (two) times a day Rinse mouth after use , Disp: 10 2 g, Rfl: 3    fluticasone (FLONASE) 50 mcg/act nasal spray, 2 sprays into each nostril daily, Disp: , Rfl:     loratadine (CLARITIN) 10 mg tablet, Take 1 tablet (10 mg total) by mouth daily, Disp: 30 tablet, Rfl: 2    pantoprazole (PROTONIX) 40 mg tablet, TAKE 1 TABLET BY MOUTH EVERY DAY, Disp: 240 tablet, Rfl: 3    ACTIVE PROBLEM LIST:  Patient Active Problem List   Diagnosis    Gastroesophageal reflux disease without esophagitis    History of colonic polyps    Kidney disease    Adrenal adenoma, right    Pancreatic cyst    Family history of pancreatic cancer    Renal cell carcinoma of right kidney ([facility] [address] )    H/O partial nephrectomy    Essential hypertension    Mixed hyperlipidemia    Brady's esophagus with dysplasia    Family hx of colon cancer       PAST MEDICAL HISTORY:  Past Medical History:   Diagnosis Date    Asthma     Chronic kidney disease     Colon polyp     GERD (gastroesophageal reflux disease)     Renal cancer, right (HCC)        PAST SURGICAL HISTORY:  Past Surgical History:   Procedure Laterality Date    CHOLECYSTECTOMY      COLONOSCOPY      KIDNEY SURGERY      NISSEN FUNDOPLICATION         FAMILY HISTORY:  Family History   Problem Relation Age of Onset    Colon cancer Mother 78        metastatic    Prostate cancer Father 76    Cancer Maternal Grandmother         bladder cancer    Cancer Maternal Uncle         bladder cancer    Pancreatic cancer Half-Brother 79       SOCIAL HISTORY:  Social History     Socioeconomic History    Marital status: /Civil Union     Spouse name: Not on file    Number of children: Not on file    Years of education: Not on file    Highest education level: Not on file   Occupational History    Not on file   Tobacco Use    Smoking status: Never Smoker    Smokeless tobacco: Never Used   Vaping Use    Vaping Use: Never used   Substance and Sexual Activity    Alcohol use: Yes     Comment: social    Drug use: Never    Sexual activity: Yes   Other Topics Concern    Not on file   Social History Narrative    Not on file     Social Determinants of Health     Financial Resource Strain: Not on file   Food Insecurity: Not on file   Transportation Needs: Not on file   Physical Activity: Not on file   Stress: Not on file   Social Connections: Not on file   Intimate Partner Violence: Not on file   Housing Stability: Not on file       Review of Systems   HENT: Positive for trouble swallowing  All other systems reviewed and are negative  Objective:  Vitals:    04/28/22 1144   BP: 144/90   BP Location: Left arm   Patient Position: Sitting   Cuff Size: Adult   Pulse: 86   Temp: 97 6 °F (36 4 °C)   SpO2: 95%   Weight: 98 kg (216 lb)   Height: 6' 1" (1 854 m)     Body mass index is 28 5 kg/m²  Physical Exam  Vitals and nursing note reviewed  Constitutional:       Appearance: Normal appearance  HENT:      Head: Normocephalic and atraumatic  Right Ear: Tympanic membrane normal       Left Ear: Tympanic membrane normal       Nose: Nose normal       Mouth/Throat:      Mouth: Mucous membranes are moist    Cardiovascular:      Rate and Rhythm: Normal rate and regular rhythm  Heart sounds: Normal heart sounds  Pulmonary:      Effort: Pulmonary effort is normal       Breath sounds: Normal breath sounds  Abdominal:      General: Bowel sounds are normal       Palpations: Abdomen is soft  Musculoskeletal:         General: Normal range of motion  Cervical back: Normal range of motion  Right lower leg: No edema  Left lower leg: No edema     Lymphadenopathy: Cervical: No cervical adenopathy  Skin:     General: Skin is warm and dry  Neurological:      General: No focal deficit present  Mental Status: He is alert and oriented to person, place, and time  Mental status is at baseline  Psychiatric:         Mood and Affect: Mood normal            RESULTS:    Recent Results (from the past 1008 hour(s))   POCT hemoglobin A1c    Collection Time: 03/18/22  9:15 AM   Result Value Ref Range    Hemoglobin A1C 5 9 6 5       This note was created with voice recognition software  Phonic, grammatical and spelling errors may be present within the note as a result

## 2022-05-02 NOTE — PATIENT INSTRUCTIONS
Scheduled date of EGD(as of today):5/28/22  Physician performing EGD:Ramesh  Location of EGD:[place]  Instructions reviewed with patient by:Beverly NARVAEZ  Clearances: none

## 2022-05-05 ENCOUNTER — EVALUATION (OUTPATIENT)
Dept: PHYSICAL THERAPY | Facility: CLINIC | Age: 62
End: 2022-05-05
Payer: COMMERCIAL

## 2022-05-05 DIAGNOSIS — G89.29 CHRONIC RIGHT-SIDED LOW BACK PAIN, UNSPECIFIED WHETHER SCIATICA PRESENT: ICD-10-CM

## 2022-05-05 DIAGNOSIS — M54.50 CHRONIC RIGHT-SIDED LOW BACK PAIN, UNSPECIFIED WHETHER SCIATICA PRESENT: ICD-10-CM

## 2022-05-05 PROCEDURE — 97140 MANUAL THERAPY 1/> REGIONS: CPT | Performed by: PHYSICAL THERAPIST

## 2022-05-05 PROCEDURE — 97110 THERAPEUTIC EXERCISES: CPT | Performed by: PHYSICAL THERAPIST

## 2022-05-05 PROCEDURE — 97161 PT EVAL LOW COMPLEX 20 MIN: CPT | Performed by: PHYSICAL THERAPIST

## 2022-05-05 NOTE — PROGRESS NOTES
PT Evaluation     Today's date: 2022  Patient name: Beverly Mckeon  : 1960  MRN: [id number]  Referring provider: Hank Rodney MD  Dx:   Encounter Diagnosis     ICD-10-CM    1  Chronic right-sided low back pain, unspecified whether sciatica present  M54 50 Ambulatory referral to Physical Therapy    G89 29        Start Time: 08  Stop Time: 930  Total time in clinic (min): 45 minutes    Assessment  Assessment details: Patient is a 62 y o  male who presents to physical therapy with c/o chronic low back pain  Pt is negative for red flags or constitutional symptoms  Patient presents to evaluation with pain, decreased range of motion, decreased strength, and decreased tolerance to activity  Pt exhibits provisional directional preference for lumbar extension and was encouraged to utilize repeated/sustained movement to self-manage symptoms  Patient demonstrates excellent tolerance to treatment and was provided with a written copy of their initial home exercise program focusing on repeated/sustained lumbar extension and was encouraged to perform daily per tolerance  I discussed risks, benefits, and alternatives to treatment, and answered all patient questions to patient satisfaction  Patient presents with baseline FOTO score of 72 indicating limited tolerance/ability to complete ADLs  Patient is an appropriate candidate for skilled PT and would benefit from skilled PT services to address the aforementioned impairments, achieve goals, maximize function, and improve quality of life  Pt is in agreement with this plan  Patient Education: activity modifications as needed, pacing of activities, importance of HEP compliance, PT prognosis/POC;  Pt was educated on centralization versus peripheralization of symptoms, use of repeated/sustained movements to self-treat symptoms, importance of postural variability throughout the day and avoidance of prolonged postures, pacing and graded exposure principles, supine to standing transfers, and activity modifications to avoid provoking low back pain        Impairments: abnormal or restricted ROM, activity intolerance, impaired physical strength, lacks appropriate home exercise program and pain with function  Understanding of Dx/Px/POC: good   Prognosis: good    Goals  ST weeks  Pt will restore full and pain-free lumbar spine AROM to allow return to normal ADLs  Pt will demonstrate centralization of symptoms with repeated movements and/or traction of lumbar spine  Pt will decrease low back pain to 3-4/10  Pt will demonstrate good understanding and compliance with HEP   Pt will demonstrate improved postural awareness and ability to self-correct without reliance on external cues    LT weeks  Pt will demonstrate abolished radicular symptoms for 2 weeks  Pt will decrease low back pain to 0-1/10  Pt will exhibit proper squatting/lifting mechanics without reliance on external cues for correction of form    Pt will be able to tolerate prolonged standing/sitting/walking activities > 30 minutes without provocation of low back pain   Pt will improve FOTO score to > or = to 75 to indicate improved functional abilities       Plan  Patient would benefit from: skilled physical therapy and PT eval  Planned modality interventions: cryotherapy and thermotherapy: hydrocollator packs  Planned therapy interventions: ADL training, manual therapy, neuromuscular re-education, body mechanics training, patient education, postural training, self care, strengthening, stretching, therapeutic activities, therapeutic exercise, home exercise program, flexibility, functional ROM exercises, graded activity and graded exercise  Frequency: 2x week  Duration in weeks: 8  Plan of Care beginning date: 2022  Plan of Care expiration date: 2022  Treatment plan discussed with: patient        Subjective Evaluation    History of Present Illness  Mechanism of injury: Pt reports to PT with c/o low back pain that started > 20 years ago that typically would be left sided in nature  Pt notes over the past year it seemed to have moved to his right side and has been trying to move carefully to not further aggravate it  Pt reports he doesn't have leg symptoms unless back pain is severe and will then have leg symptoms that he describes as shooting pain to a weakness that will travel to his knee  Pt states he has increased pain with prolonged sitting, bending movement, quick instinctual movements, bed transfers, car transfers  Pt reports his pain tends to be worse in the AM and gets progressively better as he gets moving  Pt denies changes in B&B function, unplanned weight loss, or changes in balance/gait  Pt hasn't had any course of PT in the past but has been to a chiropractor occasionally       Aggravating factors: prolonged sitting, bending movement, quick instinctual movements, bed transfers, car transfers    Easing Factors:  Rest, ice    PLOF:  Hx of LBP for > 20 years    PMH: Renal cancer in '08, HTN (controlled w/ meds), hiatal hernia repair, gallbladder removal  Pain  Current pain ratin  At best pain ratin  At worst pain rating: 10  Quality: sharp, dull ache and pulling    Treatments  Previous treatment: chiropractic  Patient Goals  Patient goals for therapy: decreased pain, increased motion, increased strength, independence with ADLs/IADLs and return to sport/leisure activities          Objective     General Comments:      Lumbar Comments   Lumbar AROM: Flex WNL (pain) Extension WNL Left SB WNL Right SB WNL    Repeated lumbar flexion in standing: increased low back pain  Repeated lumbar extension in standing: decreased pain  Repeated lumbar flexion in lying: no effect  Repeated lumbar extension in lying: abolished pain    Light touch intact and symmetrical bilateral LEs  LE myotomes WNL bilaterally     SLR: (-)  90/90 HS flexibility: (15*)  Elys: (+)    FOTO: 72             Diagnosis: Chronic low back pain - extension directional preference    Precautions: Renal cancer in '08, HTN (controlled w/ meds), hiatal hernia repair, gallbladder removal   POC Expires: 6/30/22   Re-evaluation Date: 6/2/22   FOTO Scores/Date: Goal - 75; 5/5/22 - 72   Visit Count 1/10       Manuals 5/5       Lumbar  Gr 1-4 KD                                       Ther Ex 5/5       Prone on elbows 3 min       Prone press up 2x10       Standing lumbar ext 2x10                                       Pt education KD       HEP Creation/instruction       Neuro Re-Ed                                                                                                                       Ther Act                                         Modalities

## 2022-05-19 ENCOUNTER — OFFICE VISIT (OUTPATIENT)
Dept: PHYSICAL THERAPY | Facility: CLINIC | Age: 62
End: 2022-05-19
Payer: COMMERCIAL

## 2022-05-19 DIAGNOSIS — I10 ESSENTIAL HYPERTENSION: ICD-10-CM

## 2022-05-19 DIAGNOSIS — G89.29 CHRONIC RIGHT-SIDED LOW BACK PAIN, UNSPECIFIED WHETHER SCIATICA PRESENT: Primary | ICD-10-CM

## 2022-05-19 DIAGNOSIS — M54.50 CHRONIC RIGHT-SIDED LOW BACK PAIN, UNSPECIFIED WHETHER SCIATICA PRESENT: Primary | ICD-10-CM

## 2022-05-19 PROCEDURE — 97112 NEUROMUSCULAR REEDUCATION: CPT

## 2022-05-19 PROCEDURE — 97110 THERAPEUTIC EXERCISES: CPT

## 2022-05-19 RX ORDER — AMLODIPINE BESYLATE 5 MG/1
10 TABLET ORAL DAILY
Qty: 90 TABLET | Refills: 5 | Status: SHIPPED | OUTPATIENT
Start: 2022-05-19

## 2022-05-19 RX ORDER — AMLODIPINE BESYLATE 5 MG/1
10 TABLET ORAL DAILY
Qty: 30 TABLET | Refills: 5 | Status: SHIPPED | OUTPATIENT
Start: 2022-05-19 | End: 2022-05-19 | Stop reason: SDUPTHER

## 2022-05-19 NOTE — TELEPHONE ENCOUNTER
Pt says hes running out of amolodpine but it was upped to 10 mg last he knew  Can we refill with a 90 day supply as such to [facility] ?

## 2022-05-19 NOTE — TELEPHONE ENCOUNTER
Would you be able to resend amolodipine as 90 day supply to [facility], he says its cheaper that way please

## 2022-05-19 NOTE — PROGRESS NOTES
Daily Note     Today's date: 2022  Patient name: Clarissa Loco  : 1960  MRN: [id number]  Referring provider: Janessa Loyola MD  Dx:   Encounter Diagnosis     ICD-10-CM    1  Chronic right-sided low back pain, unspecified whether sciatica present  M54 50     G89 29        Start Time: 846  Stop Time: 928  Total time in clinic (min): 42 minutes    Subjective: patient reports increased symptoms with sitting and trying to stand  Reports relief of symptoms with performance of extension based exercises on HEP  Reports one incident wear pain woke him up from sleep      Objective: See treatment diary below      Assessment:  Patient was able to complete and progress in therapy without incident or onset of pain  Was able to introduce treadmill as initial warmup and utilize extension bias to control symptoms  Was able to introduce lumbar stabilization exercises requiring initial tactile cue for corrective TA recruitment along with VC for holding during movement patterns  Demonstrates good control being challenged by normal muscular fatigue      Plan: Continue per plan of care  Progress treatment as tolerated         Diagnosis: Chronic low back pain - extension directional preference    Precautions: Renal cancer in ', HTN (controlled w/ meds), hiatal hernia repair, gallbladder removal   POC Expires: 22   Re-evaluation Date: 22   FOTO Scores/Date: Goal - 75; 22 - 72   Visit Count 1/10 5/10      Manuals       Lumbar  Gr 1-4 KD                                       Ther Ex       Prone on elbows 3 min 3 mins      Prone press up 2x10 2x10      Standing lumbar ext 2x10       Treadmill  2 2 mph x 8 mins      Modified Pedro Stretch  30"x3                       Pt education KD       HEP Creation/instruction       Neuro Re-Ed        TA Ab Set  10"x10      Alt March w/ab set  2x10      Alt Leg Ext w/ab set  2x10 ea      SLR w/ab set  2x10      Dead Bug  x20 ea      Bridges   [facility] GTB 5"x20                                                                     Ther Act                                         Modalities

## 2022-05-26 ENCOUNTER — OFFICE VISIT (OUTPATIENT)
Dept: PHYSICAL THERAPY | Facility: CLINIC | Age: 62
End: 2022-05-26
Payer: COMMERCIAL

## 2022-05-26 ENCOUNTER — TELEPHONE (OUTPATIENT)
Dept: GASTROENTEROLOGY | Facility: CLINIC | Age: 62
End: 2022-05-26

## 2022-05-26 DIAGNOSIS — G89.29 CHRONIC RIGHT-SIDED LOW BACK PAIN, UNSPECIFIED WHETHER SCIATICA PRESENT: Primary | ICD-10-CM

## 2022-05-26 DIAGNOSIS — M54.50 CHRONIC RIGHT-SIDED LOW BACK PAIN, UNSPECIFIED WHETHER SCIATICA PRESENT: Primary | ICD-10-CM

## 2022-05-26 PROCEDURE — 97112 NEUROMUSCULAR REEDUCATION: CPT | Performed by: PHYSICAL THERAPIST

## 2022-05-26 PROCEDURE — 97140 MANUAL THERAPY 1/> REGIONS: CPT | Performed by: PHYSICAL THERAPIST

## 2022-05-26 RX ORDER — SODIUM CHLORIDE, SODIUM LACTATE, POTASSIUM CHLORIDE, CALCIUM CHLORIDE 600; 310; 30; 20 MG/100ML; MG/100ML; MG/100ML; MG/100ML
125 INJECTION, SOLUTION INTRAVENOUS CONTINUOUS
Status: CANCELLED | OUTPATIENT
Start: 2022-05-26

## 2022-05-26 NOTE — PROGRESS NOTES
Daily Note     Today's date: 2022  Patient name: Beverly Mckeon  : 1960  MRN: [id number]  Referring provider: Hank Rodney MD  Dx:   Encounter Diagnosis     ICD-10-CM    1  Chronic right-sided low back pain, unspecified whether sciatica present  M54 50     G89 29        Start Time: 0845  Stop Time: 935  Total time in clinic (min): 50 minutes    Subjective: Pt reports last 2 weeks his back is doing great and hasn't had pain, reports good compliance with HEP  Objective: See treatment diary below      Assessment: Pt continues to respond excellently to lumbar extension based movements and manual lumbar  with improved pain and mobility noted post tx  Pt remains easily fatigued with mat table core stability training with occasional cues required for correction of slow LE movement and core activation with most difficulty noted during dead bugs  Pt was able to progress functional strengthening to include unilateral farmer carries and static lunges with extensive cues for correction of form and slow movement but able to complete without provoking pain, only muscle fatigue  Pt encouraged to continue with home exercises daily as symptoms allow and [place] progress as able next visit  Plan: Continue per plan of care  Progress treatment as tolerated         Diagnosis: Chronic low back pain - extension directional preference    Precautions: Renal cancer in ', HTN (controlled w/ meds), hiatal hernia repair, gallbladder removal   POC Expires: 22   Re-evaluation Date: 22   FOTO Scores/Date: Goal - 75; 22 - 72   Visit Count 1/10 2/10 3/10     Manuals      Lumbar  Gr 1-4 KD  Gr 1-4 KD                                     Ther Ex      Prone on elbows 3 min 3 mins      Prone press up 2x10 2x10 2x10 w/ manual OP     Standing lumbar ext 2x10  2x10     Treadmill  2 2 mph x 8 mins 2 2 mph x 8min     Modified Pedro Stretch  30"x3  3x30"      Static lunges   10x ea Pt education KD       HEP Creation/instruction       Neuro Re-Ed   5/26     TA Ab Set  10"x10 10x10"     Alt March w/ab set  2x10 2x10 2#     Alt Leg Ext w/ab set  2x10 ea 2x10 ea     SLR w/ab set  2x10 2x10 ea 2#     Dead Bug  x20 ea x20 ea     Bridges  DBL GTB 5"x20 [address] 20x5"     Unilateral farmer carry   10# 3 laps ea                                                            Ther Act                                         Modalities

## 2022-05-28 ENCOUNTER — ANESTHESIA (OUTPATIENT)
Dept: GASTROENTEROLOGY | Facility: HOSPITAL | Age: 62
End: 2022-05-28

## 2022-05-28 ENCOUNTER — HOSPITAL ENCOUNTER (OUTPATIENT)
Dept: GASTROENTEROLOGY | Facility: HOSPITAL | Age: 62
Setting detail: OUTPATIENT SURGERY
Discharge: HOME | End: 2022-05-28
Admitting: INTERNAL MEDICINE
Payer: COMMERCIAL

## 2022-05-28 ENCOUNTER — ANESTHESIA EVENT (OUTPATIENT)
Dept: GASTROENTEROLOGY | Facility: HOSPITAL | Age: 62
End: 2022-05-28

## 2022-05-28 VITALS
HEART RATE: 66 BPM | HEIGHT: 73 IN | OXYGEN SATURATION: 98 % | DIASTOLIC BLOOD PRESSURE: 74 MMHG | BODY MASS INDEX: 28.55 KG/M2 | TEMPERATURE: 97.7 F | WEIGHT: 215.39 LBS | RESPIRATION RATE: 18 BRPM | SYSTOLIC BLOOD PRESSURE: 120 MMHG

## 2022-05-28 DIAGNOSIS — K21.9 GASTROESOPHAGEAL REFLUX DISEASE WITHOUT ESOPHAGITIS: ICD-10-CM

## 2022-05-28 DIAGNOSIS — R13.10 DYSPHAGIA, UNSPECIFIED TYPE: ICD-10-CM

## 2022-05-28 DIAGNOSIS — K22.719 BARRETT'S ESOPHAGUS WITH DYSPLASIA: ICD-10-CM

## 2022-05-28 PROCEDURE — C1726 CATH, BAL DIL, NON-VASCULAR: HCPCS

## 2022-05-28 PROCEDURE — 88305 TISSUE EXAM BY PATHOLOGIST: CPT | Performed by: PATHOLOGY

## 2022-05-28 PROCEDURE — 88342 IMHCHEM/IMCYTCHM 1ST ANTB: CPT | Performed by: PATHOLOGY

## 2022-05-28 PROCEDURE — 43239 EGD BIOPSY SINGLE/MULTIPLE: CPT | Performed by: INTERNAL MEDICINE

## 2022-05-28 PROCEDURE — 43248 EGD GUIDE WIRE INSERTION: CPT | Performed by: INTERNAL MEDICINE

## 2022-05-28 RX ORDER — SODIUM CHLORIDE, SODIUM LACTATE, POTASSIUM CHLORIDE, CALCIUM CHLORIDE 600; 310; 30; 20 MG/100ML; MG/100ML; MG/100ML; MG/100ML
125 INJECTION, SOLUTION INTRAVENOUS CONTINUOUS
Status: DISCONTINUED | OUTPATIENT
Start: 2022-05-28 | End: 2022-06-01 | Stop reason: HOSPADM

## 2022-05-28 RX ORDER — LIDOCAINE HYDROCHLORIDE 10 MG/ML
INJECTION, SOLUTION EPIDURAL; INFILTRATION; INTRACAUDAL; PERINEURAL AS NEEDED
Status: DISCONTINUED | OUTPATIENT
Start: 2022-05-28 | End: 2022-05-28

## 2022-05-28 RX ORDER — PANTOPRAZOLE SODIUM 40 MG/1
40 TABLET, DELAYED RELEASE ORAL 2 TIMES DAILY
Qty: 240 TABLET | Refills: 3 | Status: SHIPPED | OUTPATIENT
Start: 2022-05-28

## 2022-05-28 RX ORDER — PROPOFOL 10 MG/ML
INJECTION, EMULSION INTRAVENOUS AS NEEDED
Status: DISCONTINUED | OUTPATIENT
Start: 2022-05-28 | End: 2022-05-28

## 2022-05-28 RX ADMIN — SODIUM CHLORIDE, SODIUM LACTATE, POTASSIUM CHLORIDE, AND CALCIUM CHLORIDE 125 ML/HR: .6; .31; .03; .02 INJECTION, SOLUTION INTRAVENOUS at 07:35

## 2022-05-28 RX ADMIN — LIDOCAINE HYDROCHLORIDE 50 MG: 10 INJECTION, SOLUTION EPIDURAL; INFILTRATION; INTRACAUDAL; PERINEURAL at 08:34

## 2022-05-28 RX ADMIN — PROPOFOL 150 MG: 10 INJECTION, EMULSION INTRAVENOUS at 08:34

## 2022-05-28 RX ADMIN — PROPOFOL 30 MG: 10 INJECTION, EMULSION INTRAVENOUS at 08:37

## 2022-05-28 NOTE — ANESTHESIA PREPROCEDURE EVALUATION
Procedure:  EGD    Relevant Problems   CARDIO   (+) Essential hypertension   (+) Mixed hyperlipidemia      GI/HEPATIC   (+) Gastroesophageal reflux disease without esophagitis   (+) Pancreatic cyst      /RENAL   (+) Kidney disease   (+) Renal cell carcinoma of right kidney (HCC)      NEURO/PSYCH   (+) History of colonic polyps      Renal cancer, right (HCC)    GERD (gastroesophageal reflux disease)    Colon polyp    Asthma    Chronic kidney disease        Physical Exam    Airway    Mallampati score: II  TM Distance: >3 FB  Neck ROM: full     Dental       Cardiovascular  Cardiovascular exam normal    Pulmonary  Pulmonary exam normal     Other Findings        Anesthesia Plan  ASA Score- 2     Anesthesia Type- IV sedation with anesthesia with ASA Monitors  Additional Monitors:   Airway Plan:           Plan Factors-Exercise tolerance (METS): >4 METS  Chart reviewed  EKG reviewed  Imaging results reviewed  Existing labs reviewed  Patient summary reviewed  Induction- intravenous  Postoperative Plan-     Informed Consent- Anesthetic plan and risks discussed with patient  I personally reviewed this patient with the CRNA  Discussed and agreed on the Anesthesia Plan with the CRNA  Micha Lin

## 2022-05-28 NOTE — ANESTHESIA POSTPROCEDURE EVALUATION
Post-Op Assessment Note    CV Status:  Stable    Pain management: adequate     Mental Status:  Alert and awake   Hydration Status:  Euvolemic   PONV Controlled:  Controlled   Airway Patency:  Patent      Post Op Vitals Reviewed: Yes      Staff: CRNA         No complications documented      BP   122/70   Temp   98   Pulse  74   Resp   18   SpO2   99

## 2022-05-28 NOTE — H&P
History and Physical - [facility]  Berry Timmons 65 y o  male MRN: [id number]                  HPI: Berry Timmons is a 65y o  year old male who presents for endoscopy for evaluation of GERD Brady's esophagus and dysphagia      REVIEW OF SYSTEMS: Per the HPI, and otherwise unremarkable  Historical Information   Past Medical History:   Diagnosis Date    Asthma     Chronic kidney disease     Colon polyp     GERD (gastroesophageal reflux disease)     Renal cancer, right ([address] )      Past Surgical History:   Procedure Laterality Date    CHOLECYSTECTOMY      COLONOSCOPY      HERNIA REPAIR      KIDNEY SURGERY      NISSEN FUNDOPLICATION       Social History   Social History     Substance and Sexual Activity   Alcohol Use Yes    Comment: social     Social History     Substance and Sexual Activity   Drug Use Never     Social History     Tobacco Use   Smoking Status Never Smoker   Smokeless Tobacco Never Used     Family History   Problem Relation Age of Onset    Colon cancer Mother 78        metastatic    Prostate cancer Father 76    Cancer Maternal Grandmother         bladder cancer    Cancer Maternal Uncle         bladder cancer    Pancreatic cancer Half-Brother 79       Meds/Allergies     (Not in a hospital admission)      No Known Allergies    Objective     Blood pressure 137/82, pulse 70, temperature 97 6 °F (36 4 °C), temperature source Temporal, resp  rate 16, height 6' 1" (1 854 m), weight 97 7 kg (215 lb 6 2 oz), SpO2 97 %  PHYSICAL EXAM    /82   Pulse 70   Temp 97 6 °F (36 4 °C) (Temporal)   Resp 16   Ht 6' 1" (1 854 m)   Wt 97 7 kg (215 lb 6 2 oz)   SpO2 97%   BMI 28 42 kg/m²       Gen: NAD  CV: RRR  CHEST: Clear  ABD: soft, NT/ND  EXT: no edema      ASSESSMENT/PLAN:  This is a 65y o  year old male here for endoscopy, and he is stable and optimized for his procedure

## 2022-05-31 ENCOUNTER — OFFICE VISIT (OUTPATIENT)
Dept: PAIN MEDICINE | Facility: CLINIC | Age: 62
End: 2022-05-31
Payer: COMMERCIAL

## 2022-05-31 VITALS
RESPIRATION RATE: 18 BRPM | BODY MASS INDEX: 28.63 KG/M2 | SYSTOLIC BLOOD PRESSURE: 127 MMHG | HEART RATE: 68 BPM | DIASTOLIC BLOOD PRESSURE: 83 MMHG | WEIGHT: 216 LBS | HEIGHT: 73 IN

## 2022-05-31 DIAGNOSIS — G89.29 CHRONIC RIGHT-SIDED LOW BACK PAIN, UNSPECIFIED WHETHER SCIATICA PRESENT: Primary | ICD-10-CM

## 2022-05-31 DIAGNOSIS — M54.50 CHRONIC RIGHT-SIDED LOW BACK PAIN, UNSPECIFIED WHETHER SCIATICA PRESENT: Primary | ICD-10-CM

## 2022-05-31 PROCEDURE — 99214 OFFICE O/P EST MOD 30 MIN: CPT | Performed by: STUDENT IN AN ORGANIZED HEALTH CARE EDUCATION/TRAINING PROGRAM

## 2022-05-31 NOTE — PROGRESS NOTES
Pain Medicine Follow-Up Note    Assessment:  1  Chronic right-sided low back pain, unspecified whether sciatica present        Plan:  No orders of the defined types were placed in this encounter  No orders of the defined types were placed in this encounter  My impressions and treatment recommendations were discussed in detail with the patient who verbalized understanding and had no further questions  This is a 78-year-old male who returns to our office with primarily right-sided low back pain radiating into the thigh  He has had only 3 says in physical therapy, but reports having learn some techniques that have helped to alleviate at least to severe component of his pain  I encouraged him to continue physical therapy through the full course  We went over his x-ray thoroughly which does not show any significant degenerative disease or misalignments  I feel that he will improve with continued PT  he was instructed that at the end of physical therapy, if his symptoms persist and affect his quality of life, then to reach out to our office  At that point may consider getting MRI of the lumbar spine  He will therefore follow-up as needed  [place] Pres[facility] Drug Monitoring Program report was reviewed and was appropriate     Discharge instructions were provided  I personally saw and examined the patient and I agree with the above discussed plan of care  History of Present Illness: Merry Boast is a 65 y o  male who presents to [facility] for interval re-evaluation of the above stated pain complaints  The patient has a past medical and chronic pain history as outlined in the assessment section  He was last seen on 04/19/2022 for initial evaluation  At that time was referred to physical therapy    Has only gone for 2 sessions, but reports having were insert techniques to help diminish the severe episodes of pain when he has an   Returns today with same symptoms  Pain score 2/10  Pain is worse in the morning and occasional, sharp, shooting, numbness in nature  Dereck Basilio since his last visit showed mild intervertebral disc space narrowing at L4-5 and L5-S1  Mild degenerative changes  Other than as stated above, the patient denies any interval changes in medications, medical condition, mental condition, symptoms, or allergies since the last office visit           Review of Systems:    Review of Systems      Patient Active Problem List   Diagnosis    Gastroesophageal reflux disease without esophagitis    History of colonic polyps    Kidney disease    Adrenal adenoma, right    Pancreatic cyst    Family history of pancreatic cancer    Renal cell carcinoma of right kidney ([facility] [address] [facility]    H/O partial nephrectomy    Essential hypertension    Mixed hyperlipidemia    Brady's esophagus with dysplasia    Family hx of colon cancer       Past Medical History:   Diagnosis Date    Asthma     Chronic kidney disease     Colon polyp     GERD (gastroesophageal reflux disease)     Renal cancer, right ([facility] [address] )        Past Surgical History:   Procedure Laterality Date    CHOLECYSTECTOMY      COLONOSCOPY      HERNIA REPAIR      KIDNEY SURGERY      NISSEN FUNDOPLICATION         Family History   Problem Relation Age of Onset    Colon cancer Mother 78        metastatic    Prostate cancer Father 76    Cancer Maternal Grandmother         bladder cancer    Cancer Maternal Uncle         bladder cancer    Pancreatic cancer Half-Brother 79       Social History     Occupational History    Not on file   Tobacco Use    Smoking status: Never Smoker    Smokeless tobacco: Never Used   Vaping Use    Vaping Use: Never used   Substance and Sexual Activity    Alcohol use: Yes     Comment: social    Drug use: Never    Sexual activity: Yes         Current Outpatient Medications:     albuterol (PROVENTIL HFA,VENTOLIN HFA) 90 mcg/act inhaler, Inhale 2 puffs every 6 (six) hours as needed for wheezing or shortness of breath, Disp: 18 g, Rfl: 3    amLODIPine (NORVASC) 5 mg tablet, Take 2 tablets (10 mg total) by mouth in the morning , Disp: 90 tablet, Rfl: 5    azelastine (ASTELIN) 0 1 % nasal spray, 1 spray into each nostril 2 (two) times a day Use in each nostril as directed, Disp: , Rfl:     Blood Pressure Monitoring (Blood Pressure Monitor 3) JEANNINE, USE 2 (TWO) TIMES A DAY, Disp: 1 Device, Rfl: 0    budesonide-formoterol (SYMBICORT) 160-4 5 mcg/act inhaler, Inhale 2 puffs 2 (two) times a day Rinse mouth after use , Disp: 10 2 g, Rfl: 3    fluticasone (FLONASE) 50 mcg/act nasal spray, 2 sprays into each nostril daily, Disp: , Rfl:     loratadine (CLARITIN) 10 mg tablet, Take 1 tablet (10 mg total) by mouth daily, Disp: 30 tablet, Rfl: 2    pantoprazole (PROTONIX) 40 mg tablet, Take 1 tablet (40 mg total) by mouth 2 (two) times a day, Disp: 240 tablet, Rfl: 3    sucralfate (CARAFATE) 1 g/10 mL suspension, Take 10 mL (1 g total) by mouth 4 (four) times a day, Disp: 1200 mL, Rfl: 0  No current facility-administered medications for this visit  Facility-Administered Medications Ordered in Other Visits:     lactated ringers infusion, 125 mL/hr, Intravenous, Continuous, GWENDOLYN Lizama MD, Last Rate: 125 mL/hr at 05/28/22 0827, Continue from Pre-op at 05/28/22 0827    No Known Allergies    Physical Exam:    /83   Pulse 68   Resp 18   Ht 6' 1" (1 854 m)   Wt 98 kg (216 lb)   BMI 28 50 kg/m²     Constitutional:normal, well developed, well nourished, alert, in no distress and non-toxic and no overt pain behavior    Eyes:anicteric  HEENT:grossly intact  Neck:supple, symmetric, trachea midline and no masses   Pulmonary:even and unlabored  Cardiovascular:No edema or pitting edema present  Skin:Normal without rashes or lesions and well hydrated  Psychiatric:Mood and affect appropriate  Neurologic:Cranial Nerves II-XII grossly intact  Musculoskeletal:normal      Imaging  No orders to display     LUMBAR SPINE  04/19/2022    INDICATION: M54 50: Low back pain, unspecified  G89 29: Other chronic pain  COMPARISON: None    VIEWS: XR SPINE LUMBAR 2 OR 3 VIEWS INJURY  Images: 3    FINDINGS:    There are 5 non rib bearing lumbar vertebral bodies  There is no evidence of acute fracture or destructive osseous lesion  Alignment is unremarkable  Mild intervertebral disc space narrowing at L4-5 and L5-S1  Small endplate osteophyte present at L5  The pedicles appear intact  Soft tissues are unremarkable  IMPRESSION:    Mild degenerative changes of the spine  No orders of the defined types were placed in this encounter

## 2022-06-01 ENCOUNTER — EVALUATION (OUTPATIENT)
Dept: PHYSICAL THERAPY | Facility: CLINIC | Age: 62
End: 2022-06-01
Payer: COMMERCIAL

## 2022-06-01 DIAGNOSIS — G89.29 CHRONIC RIGHT-SIDED LOW BACK PAIN, UNSPECIFIED WHETHER SCIATICA PRESENT: Primary | ICD-10-CM

## 2022-06-01 DIAGNOSIS — M54.50 CHRONIC RIGHT-SIDED LOW BACK PAIN, UNSPECIFIED WHETHER SCIATICA PRESENT: Primary | ICD-10-CM

## 2022-06-01 PROCEDURE — 97110 THERAPEUTIC EXERCISES: CPT | Performed by: PHYSICAL THERAPIST

## 2022-06-01 PROCEDURE — 97530 THERAPEUTIC ACTIVITIES: CPT | Performed by: PHYSICAL THERAPIST

## 2022-06-01 PROCEDURE — 97140 MANUAL THERAPY 1/> REGIONS: CPT | Performed by: PHYSICAL THERAPIST

## 2022-06-01 NOTE — PROGRESS NOTES
PT Re-Evaluation     Today's date: 2022  Patient name: Carly Mcgowan  : 1960  MRN: [id number]  Referring provider: Carlo Emerson MD  Dx:   Encounter Diagnosis     ICD-10-CM    1  Chronic right-sided low back pain, unspecified whether sciatica present  M54 50     G89 29        Start Time: 0845  Stop Time: 0935  Total time in clinic (min): 50 minutes    Assessment  Assessment details: Patient is a 65 y o  male with chronic low back pain and has completed 4 visits to date with slightly worsened FOTO score of 72 to 66 since initial evaluation  Despite worsened FOTO score, patient demonstrates notable subjective/objective improvement since enrolling in PT to include decreased pain and radicular symptoms, ability to self-manage symptoms with repeated lumbar extension, and pain-free lumbar ROM  Patient continues to exhibit lingering deficits to include limited tolerance for functional activities (lifting, carrying, pushing/pulling, squatting), flexion based activities, pain, and decreased activity tolerance that continues to impact ability to perform ADLs and recreational activities  Patient will benefit from continued skilled PT intervention to address the aforementioned impairments, achieve goals, maximize function, and improve quality of life  Pt is in agreement with this plan             Impairments: abnormal or restricted ROM, activity intolerance, impaired physical strength, lacks appropriate home exercise program and pain with function  Understanding of Dx/Px/POC: good   Prognosis: good    Goals  ST weeks  Pt will restore full and pain-free lumbar spine AROM to allow return to normal ADLs MET  Pt will demonstrate centralization of symptoms with repeated movements and/or traction of lumbar spine MET  Pt will decrease low back pain to 3-4/10 MET  Pt will demonstrate good understanding and compliance with HEP  MET  Pt will demonstrate improved postural awareness and ability to self-correct without reliance on external cues MET    LT weeks  Pt will demonstrate abolished radicular symptoms for 2 weeks PROGRESSING  Pt will decrease low back pain to 0-1/10 PROGRESSING  Pt will exhibit proper squatting/lifting mechanics without reliance on external cues for correction of form  MET  Pt will be able to tolerate prolonged standing/sitting/walking activities > 30 minutes without provocation of low back pain PROGRESSING  Pt will improve FOTO score to > or = to 75 to indicate improved functional abilities PROGRESSING      Plan  Plan details: Cont to tx per POC  Patient would benefit from: skilled physical therapy  Planned modality interventions: cryotherapy and thermotherapy: hydrocollator packs  Planned therapy interventions: ADL training, manual therapy, neuromuscular re-education, body mechanics training, patient education, postural training, self care, strengthening, stretching, therapeutic activities, therapeutic exercise, home exercise program, flexibility, functional ROM exercises, graded activity and graded exercise  Frequency: 2x week  Duration in weeks: 8  Plan of Care beginning date: 2022  Plan of Care expiration date: 2022  Treatment plan discussed with: patient        Subjective Evaluation    History of Present Illness  Mechanism of injury: RE-EVAL (22): Pt reports significant improvement in lower back pain and radicular symptoms since enrolling in PT and has been able to self-manage symptoms with repeated lumbar extension movements  Pt overall feels symptoms are doing well but had 1 bad day yesterday while doing heavier housework/yardwork  Pt still having limited ability to sleep at night if having an active day, as well as limited ability to perform heavy lifting/carrying and bending  IE:  Pt reports to PT with c/o low back pain that started > 20 years ago that typically would be left sided in nature   Pt notes over the past year it seemed to have moved to his right side and has been trying to move carefully to not further aggravate it  Pt reports he doesn't have leg symptoms unless back pain is severe and will then have leg symptoms that he describes as shooting pain to a weakness that will travel to his knee  Pt states he has increased pain with prolonged sitting, bending movement, quick instinctual movements, bed transfers, car transfers  Pt reports his pain tends to be worse in the AM and gets progressively better as he gets moving  Pt denies changes in B&B function, unplanned weight loss, or changes in balance/gait  Pt hasn't had any course of PT in the past but has been to a chiropractor occasionally       Aggravating factors: prolonged sitting, bending movement, quick instinctual movements, bed transfers, car transfers    Easing Factors:  Rest, ice    PLOF:  Hx of LBP for > 20 years    PMH: Renal cancer in , HTN (controlled w/ meds), hiatal hernia repair, gallbladder removal  Pain  Current pain ratin  At best pain ratin  At worst pain rating: 10  Quality: sharp, dull ache and pulling    Treatments  Previous treatment: chiropractic  Patient Goals  Patient goals for therapy: decreased pain, increased motion, increased strength, independence with ADLs/IADLs and return to sport/leisure activities          Objective     General Comments:      Lumbar Comments   Lumbar AROM: Flex WNL Extension WNL Left SB WNL Right SB WNL    Repeated lumbar flexion in standing: no effect  Repeated lumbar extension in standing: decreased pain  Repeated lumbar flexion in lying: no effect   Repeated lumbar extension in lying: abolished pain    Light touch intact and symmetrical bilateral LEs  LE myotomes WNL bilaterally     SLR: (-)  90/90 HS flexibility: (15*)  Elys: (+)    FOTO: 66 (worsened from 72 at IE)              Diagnosis: Chronic low back pain - extension directional preference    Precautions: Renal cancer in , HTN (controlled w/ meds), hiatal hernia repair, gallbladder removal   POC Expires: 6/30/22   Re-evaluation Date: 6/30/22   FOTO Scores/Date: Goal - 75; 5/5/22 - 72; 6/1/22 - 66   Visit Count 1/10 2/10 3/10 4/10    Manuals 5/5 5/19 5/26 6/1    Lumbar  Gr 1-4 KD  Gr 1-4 KD Gr 1-4 KD                                    Ther Ex 5/5 5/19 5/26 6/1    Prone on elbows 3 min 3 mins      Prone press up 2x10 2x10 2x10 w/ manual OP 2x10 w/ manual OP    Standing lumbar ext 2x10  2x10 2x10    Treadmill  2 2 mph x 8 mins 2 2 mph x 8min 2 2 mph x 6 min    Modified Pedro Stretch  30"x3  3x30"      Static lunges   10x ea      [place] pallof walk outs    10# 3 laps ea                                    Pt education KD       HEP Creation/instruction   Re-assessed lumbar spine and FOTO: updated PT prognosis/POC and goals    Neuro Re-Ed   5/26 6/1    TA Ab Set  10"x10 10x10"     Alt March w/ab set  2x10 2x10 2#     Alt Leg Ext w/ab set  2x10 ea 2x10 ea     SLR w/ab set  2x10 2x10 ea 2#     Dead Bug  x20 ea x20 ea     Bridges  DBL GTB 5"x20 [address] grn 20x5"     Unilateral farmer carry   10# 3 laps ea     [place] pallof walk outs                                                       Ther Act        6/1     SL RDL    10# to 8" box 2x10 ea    Goblet squat    20# to 4" box 2x10    Static lunges    2x10 ea     Unilateral farmer carry    10# 3 laps ea                                        Modalities

## 2022-06-03 ENCOUNTER — OFFICE VISIT (OUTPATIENT)
Dept: PHYSICAL THERAPY | Facility: CLINIC | Age: 62
End: 2022-06-03
Payer: COMMERCIAL

## 2022-06-03 DIAGNOSIS — G89.29 CHRONIC RIGHT-SIDED LOW BACK PAIN, UNSPECIFIED WHETHER SCIATICA PRESENT: Primary | ICD-10-CM

## 2022-06-03 DIAGNOSIS — M54.50 CHRONIC RIGHT-SIDED LOW BACK PAIN, UNSPECIFIED WHETHER SCIATICA PRESENT: Primary | ICD-10-CM

## 2022-06-03 PROCEDURE — 97140 MANUAL THERAPY 1/> REGIONS: CPT | Performed by: PHYSICAL THERAPIST

## 2022-06-03 PROCEDURE — 97530 THERAPEUTIC ACTIVITIES: CPT | Performed by: PHYSICAL THERAPIST

## 2022-06-03 NOTE — PROGRESS NOTES
Daily Note     Today's date: 6/3/2022  Patient name: Deb Somers  : 1960  MRN: [id number]  Referring provider: Brianne Avina MD  Dx:   Encounter Diagnosis     ICD-10-CM    1  Chronic right-sided low back pain, unspecified whether sciatica present  M54 50     G89 29        Start Time: 0845  Stop Time: 930  Total time in clinic (min): 45 minutes    Subjective: Pt reports continued improvement in symptoms with very minimal onset of back pain unless overly active doing yard work  Pt reports ability to reduce symptoms with repeated lumbar extensions  Objective: See treatment diary below      Assessment: Pt continues to respond excellently with manual interventions and lumbar extension exercises with repeated extensions performed intermittently throughout session if he had mild onset of low back pain which did occur after SL RDLs and [place] pallof press walk outs but pain was very minimal and abolished immediately after completion of 10 lumbar extensions with no provocation of radicular symptoms noted throughout session  Pt remains reliant on cues during goblet squats, SL RDLs, and lunges to ensure proper form and maintenance of neutral spinal alignment during completion  Pt denies increase in pain post tx, only muscular fatigue  HEP was updated and reviewed  Plan: Continue per plan of care  Progress treatment as tolerated         Diagnosis: Chronic low back pain - extension directional preference    Precautions: Renal cancer in ', HTN (controlled w/ meds), hiatal hernia repair, gallbladder removal   POC Expires: 22   Re-evaluation Date: 22   FOTO Scores/Date: Goal - 75; 22 - 72; 22 - 66   Visit Count 1/10 210 310 4/10 5/10   Manuals  6 6/3   Lumbar  Gr 1-4 KD  Gr 1-4 KD Gr 1-4 KD Gr 1-4 KD                                   Ther Ex  6 6/3   Prone on elbows 3 min 3 mins   3 min   Prone press up 2x10 2x10 2x10 w/ manual OP 2x10 w/ manual OP 2x10 w/ manual OP   Standing lumbar ext 2x10  2x10 2x10 2x10   Treadmill  2 2 mph x 8 mins 2 2 mph x 8min 2 2 mph x 6 min 2 2 mph x 8 min   Modified Pedro Stretch  30"x3  3x30"      Static lunges   10x ea      Sandee pallof walk outs    10# 3 laps ea 10# 3 laps ea                                   Pt education KD       HEP Creation/instruction   Re-assessed lumbar spine and FOTO: updated PT prognosis/POC and goals    Neuro Re-Ed   5/26 6/1 6/3   TA Ab Set  10"x10 10x10"     Alt March w/ab set  2x10 2x10 2#     Alt Leg Ext w/ab set  2x10 ea 2x10 ea     SLR w/ab set  2x10 2x10 ea 2#     Dead Bug  x20 ea x20 ea  x20 ea   Bridges  DBL GTB 5"x20 DBL grn 20x5"     Unilateral farmer carry   10# 3 laps ea     Sandee pallof walk outs                                                       Ther Act        6/1  6/3   SL RDL    10# to 8" box 2x10 ea 10# to 8" box 2x10 ea   Goblet squat    20# to 4" box 2x10 20x to 4" box 3x10   Static lunges    2x10 ea  2x10 ea   Unilateral farmer carry    10# 3 laps ea 10# 3 laps ea                                       Modalities

## 2022-06-08 ENCOUNTER — OFFICE VISIT (OUTPATIENT)
Dept: PHYSICAL THERAPY | Facility: CLINIC | Age: 62
End: 2022-06-08
Payer: COMMERCIAL

## 2022-06-08 DIAGNOSIS — G89.29 CHRONIC RIGHT-SIDED LOW BACK PAIN, UNSPECIFIED WHETHER SCIATICA PRESENT: Primary | ICD-10-CM

## 2022-06-08 DIAGNOSIS — M54.50 CHRONIC RIGHT-SIDED LOW BACK PAIN, UNSPECIFIED WHETHER SCIATICA PRESENT: Primary | ICD-10-CM

## 2022-06-08 PROCEDURE — 97140 MANUAL THERAPY 1/> REGIONS: CPT | Performed by: PHYSICAL THERAPIST

## 2022-06-08 PROCEDURE — 97530 THERAPEUTIC ACTIVITIES: CPT | Performed by: PHYSICAL THERAPIST

## 2022-06-08 NOTE — PROGRESS NOTES
Daily Note     Today's date: 2022  Patient name: Sara Mendiola  : 1960  MRN: [id number]  Referring provider: Carlo Sotelo MD  Dx:   Encounter Diagnosis     ICD-10-CM    1  Chronic right-sided low back pain, unspecified whether sciatica present  M54 50     G89 29        Start Time: 0850  Stop Time: 0935  Total time in clinic (min): 45 minutes    Subjective: Pt continues to feel improved symptoms with ability to control his pain with repeated back extensions throughout day as needed  Objective: See treatment diary below      Assessment: Pt continues to note significant improvement in lower back pain with consistent ability to self-manage symptoms throughout his day if symptoms are provoked  Pt was able to complete all exercises today without provoking lower back pain or leg symptoms but continued difficulty noted with SL RDLs, static lunges, and pallof press walk outs with cues required intermittently for correction of form and eccentric control with notable difficulty with single leg balance during RDLs  Discussed progress with pt and tentatively planning for discharge to [facility] next visit  Plan: Continue per plan of care  Progress treatment as tolerated         Diagnosis: Chronic low back pain - extension directional preference    Precautions: Renal cancer in , HTN (controlled w/ meds), hiatal hernia repair, gallbladder removal   POC Expires: 22   Re-evaluation Date: 22   FOTO Scores/Date: Goal - 75; 22 - 72; 22 - 66   Visit Count 6/10 2/10 3/10 4/10 5/10   Manuals  6/3   Lumbar  Gr 1-4 KD  Gr 1-4 KD Gr 1-4 KD Gr 1-4 KD                                   Ther Ex  6 6/3   Prone on elbows 3 min 3 mins   3 min   Prone press up 2x10 w/ manual OP 2x10 2x10 w/ manual OP 2x10 w/ manual OP 2x10 w/ manual OP   Standing lumbar ext 2x10  2x10 2x10 2x10   Treadmill 2 7 mph x 6 min 2 2 mph x 8 mins 2 2 mph x 8min 2 2 mph x 6 min 2 2 mph x 8 min Modified Pedro Stretch  30"x3  3x30"      Static lunges   10x ea      [place] pallof walk outs 12# 3 laps ea   10# 3 laps ea 10# 3 laps ea                                   Pt education KD       HEP Creation/instruction   Re-assessed lumbar spine and FOTO: updated PT prognosis/POC and goals    Neuro Re-Ed 6/8  5/26 6/1 6/3   TA Ab Set  10"x10 10x10"     Alt March w/ab set  2x10 2x10 2#     Alt Leg Ext w/ab set  2x10 ea 2x10 ea     SLR w/ab set  2x10 2x10 ea 2#     Dead Bug x20 ea x20 ea x20 ea  x20 ea   Bridges  DBL GTB 5"x20 DBL grn 20x5"     Unilateral farmer carry   10# 3 laps ea     [place] pallof walk outs                                                       Ther Act  6/8      6/1  6/3   SL RDL 10# to 8" box 2x10 ea   10# to 8" box 2x10 ea 10# to 8" box 2x10 ea   Goblet squat 20# to 4" box 3x10    20# to 4" box 2x10 20x to 4" box 3x10   Static lunges 2x10 ea   2x10 ea  2x10 ea   Unilateral farmer carry 10# 3 laps ea   10# 3 laps ea 10# 3 laps ea                                       Modalities

## 2022-06-10 ENCOUNTER — OFFICE VISIT (OUTPATIENT)
Dept: PHYSICAL THERAPY | Facility: CLINIC | Age: 62
End: 2022-06-10
Payer: COMMERCIAL

## 2022-06-10 DIAGNOSIS — M54.50 CHRONIC RIGHT-SIDED LOW BACK PAIN, UNSPECIFIED WHETHER SCIATICA PRESENT: Primary | ICD-10-CM

## 2022-06-10 DIAGNOSIS — G89.29 CHRONIC RIGHT-SIDED LOW BACK PAIN, UNSPECIFIED WHETHER SCIATICA PRESENT: Primary | ICD-10-CM

## 2022-06-10 PROCEDURE — 97140 MANUAL THERAPY 1/> REGIONS: CPT | Performed by: PHYSICAL THERAPIST

## 2022-06-10 PROCEDURE — 97110 THERAPEUTIC EXERCISES: CPT | Performed by: PHYSICAL THERAPIST

## 2022-06-10 NOTE — PROGRESS NOTES
PT Discharge    Today's date: 6/10/2022  Patient name: Lauren Kemp  : 1960  MRN: [id number]  Referring provider: Nissa Brooks MD  Dx:   Encounter Diagnosis     ICD-10-CM    1  Chronic right-sided low back pain, unspecified whether sciatica present  M54 50     G89 29        Start Time: 855  Stop Time: 935  Total time in clinic (min): 40 minutes    Assessment  Assessment details: Pt has been seen for 7 visits and has made excellent subjective/objective improvements since enrolling in therapy with improved FOTO score from 72 to 83 since initial evaluation  Pt has returned all normal ADLs and recreational activities without pain or limitation and is appropriate for discharge to home exercise program at this time  Pt encouraged to continue with HEP on regular basis per tolerance and was educated on how to progress/regress exercises per symptoms/tolerance  Pt is in agreement with plan                 Goals  ST weeks  Pt will restore full and pain-free lumbar spine AROM to allow return to normal ADLs MET  Pt will demonstrate centralization of symptoms with repeated movements and/or traction of lumbar spine MET  Pt will decrease low back pain to 3-4/10 MET  Pt will demonstrate good understanding and compliance with HEP  MET  Pt will demonstrate improved postural awareness and ability to self-correct without reliance on external cues MET    LT weeks  Pt will demonstrate abolished radicular symptoms for 2 weeks MET  Pt will decrease low back pain to 0-1/10 MET  Pt will exhibit proper squatting/lifting mechanics without reliance on external cues for correction of form  MET  Pt will be able to tolerate prolonged standing/sitting/walking activities > 30 minutes without provocation of low back pain MET  Pt will improve FOTO score to > or = to 75 to indicate improved functional abilities MET      Plan  Plan details: Discharge to HEP  Patient would benefit from: skilled physical therapy  Planned modality interventions: cryotherapy and thermotherapy: hydrocollator packs  Planned therapy interventions: ADL training, manual therapy, neuromuscular re-education, body mechanics training, patient education, postural training, self care, strengthening, stretching, therapeutic activities, therapeutic exercise, home exercise program, flexibility, functional ROM exercises, graded activity and graded exercise  Treatment plan discussed with: patient        Subjective Evaluation    History of Present Illness  Mechanism of injury: Pt reports significant improvement in lower back and hasn't had any leg symptoms in a few weeks  Pt reports he is much more aware of his lifting/carrying mechanics while doing house work and yard work  Pt reports ability to self-manage onset of low back pain with repeated lumbar extension movements and pleased with his progress in therapy  Pt feels he is ready to be discharged to a home program at this time     Pain  Current pain ratin  At best pain ratin  At worst pain ratin  Quality: dull ache          Objective     General Comments:      Lumbar Comments   Lumbar AROM: Flex WNL Extension WNL Left SB WNL Right SB WNL    Light touch intact and symmetrical bilateral LEs  LE myotomes WNL bilaterally     SLR: (-)  90/90 HS flexibility: (15*)  Elys: (-)    FOTO: 83 (improved from 72 at IE)              Diagnosis: Chronic low back pain - extension directional preference    Precautions: Renal cancer in , HTN (controlled w/ meds), hiatal hernia repair, gallbladder removal   POC Expires: 22   Re-evaluation Date: 22   FOTO Scores/Date: Goal - 75; 22 - 72; 22 - 66; 6/10/22 - 83   Visit Count 6/10 7/10 3/10 4/10 5/10   Manuals 6/8 6/10 5/26 6 6/3   Lumbar  Gr 1-4 KD Gr 1-4 KD Gr 1-4 KD Gr 1-4 KD Gr 1-4 KD                                   Ther Ex 6/8 6/10 5/26 6 6/3   Prone on elbows 3 min 3 mins   3 min   Prone press up 2x10 w/ manual OP 2x10 2x10 w/ manual OP 2x10 w/ manual OP 2x10 w/ manual OP   Standing lumbar ext 2x10 2x10 2x10 2x10 2x10   Treadmill 2 7 mph x 6 min 2 2 mph x 8 mins 2 2 mph x 8min 2 2 mph x 6 min 2 2 mph x 8 min   Modified Pedro Stretch  30"x3  3x30"      Static lunges   10x ea      Sandee pallof walk outs 12# 3 laps ea 14# 3 laps ea  10# 3 laps ea 10# 3 laps ea                                   Pt education KD       HEP Creation/instruction Re-assessed lumbar spine and FOTO; HEP creation and instruction, educated on use of proper lifting/squatting mechanics, use of repeated lumbar extension to self-manage pain sx  Re-assessed lumbar spine and FOTO: updated PT prognosis/POC and goals    Neuro Re-Ed 6/8 6/10 5/26 6/1 6/3   TA Ab Set   10x10"     Alt March w/ab set   2x10 2#     Alt Leg Ext w/ab set   2x10 ea     SLR w/ab set   2x10 ea 2#     Dead Bug x20 ea  x20 ea  x20 ea   Bridges   DBL grn 20x5"     Unilateral farmer carry   10# 3 laps ea     Sandee pallof walk outs                                                       Ther Act  6/8  6/10    6/1  6/3   SL RDL 10# to 8" box 2x10 ea 10# to 8" box 2x10  10# to 8" box 2x10 ea 10# to 8" box 2x10 ea   Goblet squat 20# to 4" box 3x10  20# to 4" box 3x10  20# to 4" box 2x10 20x to 4" box 3x10   Static lunges 2x10 ea   2x10 ea  2x10 ea   Unilateral farmer carry 10# 3 laps ea   10# 3 laps ea 10# 3 laps ea                                       Modalities

## 2022-06-11 ENCOUNTER — APPOINTMENT (EMERGENCY)
Dept: RADIOLOGY | Facility: HOSPITAL | Age: 62
End: 2022-06-11
Payer: COMMERCIAL

## 2022-06-11 ENCOUNTER — HOSPITAL ENCOUNTER (EMERGENCY)
Facility: HOSPITAL | Age: 62
Discharge: HOME | End: 2022-06-11
Attending: EMERGENCY MEDICINE
Payer: COMMERCIAL

## 2022-06-11 VITALS
HEART RATE: 57 BPM | DIASTOLIC BLOOD PRESSURE: 89 MMHG | SYSTOLIC BLOOD PRESSURE: 146 MMHG | OXYGEN SATURATION: 96 % | TEMPERATURE: 98 F | RESPIRATION RATE: 18 BRPM

## 2022-06-11 DIAGNOSIS — R07.9 CHEST PAIN: Primary | ICD-10-CM

## 2022-06-11 LAB
2HR DELTA HS TROPONIN: 3 NG/L
ALBUMIN SERPL BCP-MCNC: 3.6 G/DL (ref 3.5–5)
ALP SERPL-CCNC: 80 U/L (ref 46–116)
ALT SERPL W P-5'-P-CCNC: 24 U/L (ref 12–78)
ANION GAP SERPL CALCULATED.3IONS-SCNC: 7 MMOL/L (ref 4–13)
APTT PPP: 31 SECONDS (ref 23–37)
AST SERPL W P-5'-P-CCNC: 20 U/L (ref 5–45)
ATRIAL RATE: 59 BPM
ATRIAL RATE: 64 BPM
BASOPHILS # BLD AUTO: 0.01 THOUSANDS/ΜL (ref 0–0.1)
BASOPHILS NFR BLD AUTO: 0 % (ref 0–1)
BILIRUB SERPL-MCNC: 0.26 MG/DL (ref 0.2–1)
BUN SERPL-MCNC: 23 MG/DL (ref 5–25)
CALCIUM SERPL-MCNC: 8.5 MG/DL (ref 8.3–10.1)
CARDIAC TROPONIN I PNL SERPL HS: 3 NG/L
CARDIAC TROPONIN I PNL SERPL HS: 6 NG/L
CHLORIDE SERPL-SCNC: 105 MMOL/L (ref 100–108)
CO2 SERPL-SCNC: 28 MMOL/L (ref 21–32)
CREAT SERPL-MCNC: 1.33 MG/DL (ref 0.6–1.3)
EOSINOPHIL # BLD AUTO: 0.13 THOUSAND/ΜL (ref 0–0.61)
EOSINOPHIL NFR BLD AUTO: 3 % (ref 0–6)
ERYTHROCYTE [DISTWIDTH] IN BLOOD BY AUTOMATED COUNT: 15.2 % (ref 11.6–15.1)
GFR SERPL CREATININE-BSD FRML MDRD: 57 ML/MIN/1.73SQ M
GLUCOSE SERPL-MCNC: 114 MG/DL (ref 65–140)
HCT VFR BLD AUTO: 43.7 % (ref 36.5–49.3)
HGB BLD-MCNC: 14.1 G/DL (ref 12–17)
IMM GRANULOCYTES # BLD AUTO: 0.01 THOUSAND/UL (ref 0–0.2)
IMM GRANULOCYTES NFR BLD AUTO: 0 % (ref 0–2)
INR PPP: 1.09 (ref 0.84–1.19)
LYMPHOCYTES # BLD AUTO: 1.98 THOUSANDS/ΜL (ref 0.6–4.47)
LYMPHOCYTES NFR BLD AUTO: 40 % (ref 14–44)
MCH RBC QN AUTO: 28 PG (ref 26.8–34.3)
MCHC RBC AUTO-ENTMCNC: 32.3 G/DL (ref 31.4–37.4)
MCV RBC AUTO: 87 FL (ref 82–98)
MONOCYTES # BLD AUTO: 0.51 THOUSAND/ΜL (ref 0.17–1.22)
MONOCYTES NFR BLD AUTO: 10 % (ref 4–12)
NEUTROPHILS # BLD AUTO: 2.37 THOUSANDS/ΜL (ref 1.85–7.62)
NEUTS SEG NFR BLD AUTO: 47 % (ref 43–75)
NRBC BLD AUTO-RTO: 0 /100 WBCS
P AXIS: 42 DEGREES
P AXIS: 49 DEGREES
PLATELET # BLD AUTO: 202 THOUSANDS/UL (ref 149–390)
PMV BLD AUTO: 10.4 FL (ref 8.9–12.7)
POTASSIUM SERPL-SCNC: 4.1 MMOL/L (ref 3.5–5.3)
PR INTERVAL: 140 MS
PR INTERVAL: 152 MS
PROT SERPL-MCNC: 7.1 G/DL (ref 6.4–8.2)
PROTHROMBIN TIME: 13.7 SECONDS (ref 11.6–14.5)
QRS AXIS: 26 DEGREES
QRS AXIS: 29 DEGREES
QRSD INTERVAL: 80 MS
QRSD INTERVAL: 82 MS
QT INTERVAL: 404 MS
QT INTERVAL: 428 MS
QTC INTERVAL: 399 MS
QTC INTERVAL: 441 MS
RBC # BLD AUTO: 5.04 MILLION/UL (ref 3.88–5.62)
SODIUM SERPL-SCNC: 140 MMOL/L (ref 136–145)
T WAVE AXIS: 33 DEGREES
T WAVE AXIS: 35 DEGREES
VENTRICULAR RATE: 59 BPM
VENTRICULAR RATE: 64 BPM
WBC # BLD AUTO: 5.01 THOUSAND/UL (ref 4.31–10.16)

## 2022-06-11 PROCEDURE — 84484 ASSAY OF TROPONIN QUANT: CPT | Performed by: EMERGENCY MEDICINE

## 2022-06-11 PROCEDURE — 71046 X-RAY EXAM CHEST 2 VIEWS: CPT

## 2022-06-11 PROCEDURE — 99284 EMERGENCY DEPT VISIT MOD MDM: CPT | Performed by: EMERGENCY MEDICINE

## 2022-06-11 PROCEDURE — 80053 COMPREHEN METABOLIC PANEL: CPT | Performed by: EMERGENCY MEDICINE

## 2022-06-11 PROCEDURE — 99285 EMERGENCY DEPT VISIT HI MDM: CPT

## 2022-06-11 PROCEDURE — 93010 ELECTROCARDIOGRAM REPORT: CPT | Performed by: INTERNAL MEDICINE

## 2022-06-11 PROCEDURE — 36415 COLL VENOUS BLD VENIPUNCTURE: CPT | Performed by: EMERGENCY MEDICINE

## 2022-06-11 PROCEDURE — 85730 THROMBOPLASTIN TIME PARTIAL: CPT | Performed by: EMERGENCY MEDICINE

## 2022-06-11 PROCEDURE — 93005 ELECTROCARDIOGRAM TRACING: CPT

## 2022-06-11 PROCEDURE — 85610 PROTHROMBIN TIME: CPT | Performed by: EMERGENCY MEDICINE

## 2022-06-11 PROCEDURE — 85025 COMPLETE CBC W/AUTO DIFF WBC: CPT | Performed by: EMERGENCY MEDICINE

## 2022-06-11 NOTE — ED PROVIDER NOTES
History  Chief Complaint   Patient presents with    Chest Pain     Pt arrived via hospital wheelchair with c/o chest pain that started less than an hour ago  Pt has a cardiac hx     65year old male patient presents emergency department for evaluation of chest pains which woke him up from sleep at approximately 0200 hours  Patient states that the pain radiates into his left arm and does drop  He was concerned that this was his blood pressure because it was elevated so he took his blood pressure medications  The patient has had two previous cardiac catheterizations done and describes an angioplasty procedure being done at least one of them but states no stents were placed  Currently is lying in bed, no apparent distress, speaking full sentences, not pausing breathe  ACS evaluation of his entered  EKG done at 0246 hours, interpreted by me, shows normal sinus rhythm with a physiologic left axis deviation, no noted ectopy, no ST segment elevation or depression indicative of acute ischemia  There was no previous EKG that I was able to compare with  History provided by:  Patient   used: No    Chest Pain  Pain location:  Substernal area  Pain quality: aching    Pain radiates to:  L shoulder and L jaw  Pain radiates to the back: no    Pain severity:  Mild  Onset quality:  Gradual  Timing:  Constant  Progression:  Worsening  Chronicity:  New  Relieved by:  Nothing  Worsened by:  Nothing tried  Ineffective treatments:  None tried  Associated symptoms: no altered mental status, no diaphoresis, no heartburn and not vomiting        Prior to Admission Medications   Prescriptions Last Dose Informant Patient Reported? Taking?    Blood Pressure Monitoring (Blood Pressure Monitor 3) JEANNINE  Self No No   Sig: USE 2 (TWO) TIMES A DAY   albuterol (PROVENTIL HFA,VENTOLIN HFA) 90 mcg/act inhaler  Self No No   Sig: Inhale 2 puffs every 6 (six) hours as needed for wheezing or shortness of breath   amLODIPine (NORVASC) 5 mg tablet  Self No No   Sig: Take 2 tablets (10 mg total) by mouth in the morning  azelastine (ASTELIN) 0 1 % nasal spray  Self Yes No   Si spray into each nostril 2 (two) times a day Use in each nostril as directed   budesonide-formoterol (SYMBICORT) 160-4 5 mcg/act inhaler  Self No No   Sig: Inhale 2 puffs 2 (two) times a day Rinse mouth after use  fluticasone (FLONASE) 50 mcg/act nasal spray  Self Yes No   Si sprays into each nostril daily   loratadine (CLARITIN) 10 mg tablet  Self No No   Sig: Take 1 tablet (10 mg total) by mouth daily   pantoprazole (PROTONIX) 40 mg tablet  Self No No   Sig: Take 1 tablet (40 mg total) by mouth 2 (two) times a day   sucralfate (CARAFATE) 1 g/10 mL suspension   No No   Sig: Take 10 mL (1 g total) by mouth 4 (four) times a day      Facility-Administered Medications: None       Past Medical History:   Diagnosis Date    Asthma     Chronic kidney disease     Colon polyp     GERD (gastroesophageal reflux disease)     Renal cancer, right (HCC)        Past Surgical History:   Procedure Laterality Date    CHOLECYSTECTOMY      COLONOSCOPY      HERNIA REPAIR      KIDNEY SURGERY      NISSEN FUNDOPLICATION         Family History   Problem Relation Age of Onset    Colon cancer Mother 78        metastatic    Prostate cancer Father 76    Cancer Maternal Grandmother         bladder cancer    Cancer Maternal Uncle         bladder cancer    Pancreatic cancer Half-Brother 79     I have reviewed and agree with the history as documented      E-Cigarette/Vaping    E-Cigarette Use Never User      E-Cigarette/Vaping Substances    Nicotine No     THC No     CBD No     Flavoring No     Other No     Unknown No      Social History     Tobacco Use    Smoking status: Never Smoker    Smokeless tobacco: Never Used   Vaping Use    Vaping Use: Never used   Substance Use Topics    Alcohol use: Yes     Comment: social    Drug use: Never       Review of Systems Constitutional: Negative for diaphoresis  Cardiovascular: Positive for chest pain  Gastrointestinal: Negative for heartburn and vomiting  All other systems reviewed and are negative  Physical Exam  Physical Exam  Vitals and nursing note reviewed  Constitutional:       General: He is not in acute distress  Appearance: He is well-developed  He is not diaphoretic  HENT:      Head: Normocephalic and atraumatic  Right Ear: External ear normal       Left Ear: External ear normal    Eyes:      General: No scleral icterus  Right eye: No discharge  Left eye: No discharge  Conjunctiva/sclera: Conjunctivae normal    Neck:      Thyroid: No thyromegaly  Vascular: No JVD  Trachea: No tracheal deviation  Cardiovascular:      Rate and Rhythm: Normal rate and regular rhythm  Pulmonary:      Effort: Pulmonary effort is normal  No respiratory distress  Breath sounds: Normal breath sounds  No stridor  No wheezing or rales  Abdominal:      General: Bowel sounds are normal  There is no distension  Palpations: Abdomen is soft  Tenderness: There is no abdominal tenderness  Musculoskeletal:         General: No tenderness or deformity  Normal range of motion  Cervical back: Normal range of motion and neck supple  Skin:     General: Skin is warm and dry  Neurological:      Mental Status: He is alert and oriented to person, place, and time  Cranial Nerves: No cranial nerve deficit        Coordination: Coordination normal    Psychiatric:         Behavior: Behavior normal          Vital Signs  ED Triage Vitals   Temperature Pulse Respirations Blood Pressure SpO2   06/11/22 0345 06/11/22 0245 06/11/22 0245 06/11/22 0245 06/11/22 0245   98 °F (36 7 °C) 61 20 (!) 183/107 99 %      Temp Source Heart Rate Source Patient Position - Orthostatic VS BP Location FiO2 (%)   06/11/22 0345 06/11/22 0330 06/11/22 0245 06/11/22 0245 --   Oral Monitor Sitting Right arm Pain Score       --                  Vitals:    06/11/22 0330 06/11/22 0400 06/11/22 0430 06/11/22 0615   BP: 140/87 135/84 136/79 146/89   Pulse: 59 59 60 57   Patient Position - Orthostatic VS: Sitting Sitting Sitting Sitting         Visual Acuity  Visual Acuity    Flowsheet Row Most Recent Value   L Pupil Size (mm) 3   R Pupil Size (mm) 3          ED Medications  Medications - No data to display    Diagnostic Studies  Results Reviewed     Procedure Component Value Units Date/Time    HS Troponin I 2hr [[id number]]  (Normal) Collected: 06/11/22 0523    Lab Status: Final result Specimen: Blood from Arm, Right Updated: 06/11/22 0558     hs TnI 2hr 6 ng/L      Delta 2hr hsTnI 3 ng/L     HS Troponin 0hr (reflex protocol) [[id number]]  (Normal) Collected: 06/11/22 0321    Lab Status: Final result Specimen: Blood from Arm, Right Updated: 06/11/22 0351     hs TnI 0hr 3 ng/L     Comprehensive metabolic panel [[id number]]  (Abnormal) Collected: 06/11/22 0321    Lab Status: Final result Specimen: Blood from Arm, Right Updated: 06/11/22 0346     Sodium 140 mmol/L      Potassium 4 1 mmol/L      Chloride 105 mmol/L      CO2 28 mmol/L      ANION GAP 7 mmol/L      BUN 23 mg/dL      Creatinine 1 33 mg/dL      Glucose 114 mg/dL      Calcium 8 5 mg/dL      AST 20 U/L      ALT 24 U/L      Alkaline Phosphatase 80 U/L      Total Protein 7 1 g/dL      Albumin 3 6 g/dL      Total Bilirubin 0 26 mg/dL      eGFR 57 ml/min/1 73sq m     Narrative:      Maynor guidelines for Chronic Kidney Disease (CKD):     Stage 1 with normal or high GFR (GFR > 90 mL/min/1 73 square meters)    Stage 2 Mild CKD (GFR = 60-89 mL/min/1 73 square meters)    Stage 3A Moderate CKD (GFR = 45-59 mL/min/1 73 square meters)    Stage 3B Moderate CKD (GFR = 30-44 mL/min/1 73 square meters)    Stage 4 Severe CKD (GFR = 15-29 mL/min/1 73 square meters)    Stage 5 End Stage CKD (GFR <15 mL/min/1 73 square meters)  Note: GFR calculation is accurate only with a steady state creatinine    Protime-INR [[id number]]  (Normal) Collected: 06/11/22 0321    Lab Status: Final result Specimen: Blood from Arm, Right Updated: 06/11/22 0340     Protime 13 7 seconds      INR 1 09    APTT [[id number]]  (Normal) Collected: 06/11/22 0321    Lab Status: Final result Specimen: Blood from Arm, Right Updated: 06/11/22 0340     PTT 31 seconds     CBC and differential [[id number]]  (Abnormal) Collected: 06/11/22 0321    Lab Status: Final result Specimen: Blood from Arm, Right Updated: 06/11/22 0328     WBC 5 01 Thousand/uL      RBC 5 04 Million/uL      Hemoglobin 14 1 g/dL      Hematocrit 43 7 %      MCV 87 fL      MCH 28 0 pg      MCHC 32 3 g/dL      RDW 15 2 %      MPV 10 4 fL      Platelets 823 Thousands/uL      nRBC 0 /100 WBCs      Neutrophils Relative 47 %      Immat GRANS % 0 %      Lymphocytes Relative 40 %      Monocytes Relative 10 %      Eosinophils Relative 3 %      Basophils Relative 0 %      Neutrophils Absolute 2 37 Thousands/µL      Immature Grans Absolute 0 01 Thousand/uL      Lymphocytes Absolute 1 98 Thousands/µL      Monocytes Absolute 0 51 Thousand/µL      Eosinophils Absolute 0 13 Thousand/µL      Basophils Absolute 0 01 Thousands/µL                  XR chest 2 views   Final Result by Luiz Colorado MD (06/11 6250)      No acute cardiopulmonary disease  Workstation performed: [id number]                    Procedures  Procedures         ED Course                               SBIRT 20yo+    Flowsheet Row Most Recent Value   SBIRT (25 yo +)    In order to provide better care to our patients, we are screening all of our patients for alcohol and drug use  Would it be okay to ask you these screening questions? Yes Filed at: 06/11/2022 0345   Initial Alcohol Screen: US AUDIT-C     1  How often do you have a drink containing alcohol? 0 Filed at: 06/11/2022 0345   2   How many drinks containing alcohol do you have on a typical day you are drinking? 0 Filed at: 06/11/2022 0345   3a  Male UNDER 65: How often do you have five or more drinks on one occasion? 0 Filed at: 06/11/2022 0345   Audit-C Score 0 Filed at: 06/11/2022 0345   ROMEO: How many times in the past year have you    Used an illegal drug or used a prescription medication for non-medical reasons? Never Filed at: 06/11/2022 0345                    MDM  Number of Diagnoses or Management Options  Chest pain: new and requires workup     Amount and/or Complexity of Data Reviewed  Clinical lab tests: ordered and reviewed  Tests in the radiology section of CPT®: ordered and reviewed    Patient Progress  Patient progress: stable      Disposition  Final diagnoses:   Chest pain     Time reflects when diagnosis was documented in both MDM as applicable and the Disposition within this note     Time User Action Codes Description Comment    6/11/2022  6:21 AM Axel Pack Add [R07 9] Chest pain       ED Disposition     ED Disposition   Discharge    Condition   Stable    Date/Time   Sat Jun 11, 2022  6:21 AM    Comment   Clarissa Loco discharge to home/self care                 Follow-up Information     Follow up With Specialties Details Why Geneva Pineda MD Internal Medicine   [address]  [facility]  [facility] [place] [address]            Discharge Medication List as of 6/11/2022  6:21 AM      CONTINUE these medications which have NOT CHANGED    Details   albuterol (PROVENTIL HFA,VENTOLIN HFA) 90 mcg/act inhaler Inhale 2 puffs every 6 (six) hours as needed for wheezing or shortness of breath, Starting Thu 4/28/2022, Normal      amLODIPine (NORVASC) 5 mg tablet Take 2 tablets (10 mg total) by mouth in the morning , Starting Thu 5/19/2022, Normal      azelastine (ASTELIN) 0 1 % nasal spray 1 spray into each nostril 2 (two) times a day Use in each nostril as directed, Historical Med      Blood Pressure Monitoring (Blood Pressure Monitor 3) JEANNINE USE 2 (TWO) TIMES A DAY, Normal budesonide-formoterol (SYMBICORT) 160-4 5 mcg/act inhaler Inhale 2 puffs 2 (two) times a day Rinse mouth after use , Starting Thu 4/28/2022, Normal      fluticasone (FLONASE) 50 mcg/act nasal spray 2 sprays into each nostril daily, Historical Med      loratadine (CLARITIN) 10 mg tablet Take 1 tablet (10 mg total) by mouth daily, Starting Thu 4/28/2022, Until Wed 7/27/2022, Normal      pantoprazole (PROTONIX) 40 mg tablet Take 1 tablet (40 mg total) by mouth 2 (two) times a day, Starting Sat 5/28/2022, Normal      sucralfate (CARAFATE) 1 g/10 mL suspension Take 10 mL (1 g total) by mouth 4 (four) times a day, Starting Thu 4/28/2022, Until Sat 5/28/2022, Normal             No discharge procedures on file      PDMP Review       Value Time User    PDMP Reviewed  Yes 4/19/2022  8:36 AM Myra Naqvi MD          ED Provider  Electronically Signed by           Luther Reynolds DO  06/13/22 6446

## 2022-06-14 ENCOUNTER — TELEPHONE (OUTPATIENT)
Dept: GASTROENTEROLOGY | Facility: CLINIC | Age: 62
End: 2022-06-14

## 2022-06-14 NOTE — TELEPHONE ENCOUNTER
Called and spoke with patient in regards to biopsy results  Pt expressed understanding and has no further concerns at this time

## 2022-06-14 NOTE — TELEPHONE ENCOUNTER
----- Message from Nicho Valdez MD sent at 6/14/2022 11:19 AM EDT -----  Please tell him that the biopsy show stable Brady's esophagus and he will be due for follow-up in 3 years

## 2022-06-15 ENCOUNTER — TELEPHONE (OUTPATIENT)
Dept: GASTROENTEROLOGY | Facility: CLINIC | Age: 62
End: 2022-06-15

## 2022-11-02 LAB
ALBUMIN SERPL-MCNC: 4.5 G/DL (ref 3.6–5.1)
ALBUMIN/GLOB SERPL: 1.6 (CALC) (ref 1–2.5)
ALP SERPL-CCNC: 78 U/L (ref 35–144)
ALT SERPL-CCNC: 16 U/L (ref 9–46)
AST SERPL-CCNC: 19 U/L (ref 10–35)
BASOPHILS # BLD AUTO: 10 CELLS/UL (ref 0–200)
BASOPHILS NFR BLD AUTO: 0.2 %
BILIRUB SERPL-MCNC: 0.3 MG/DL (ref 0.2–1.2)
BUN SERPL-MCNC: 24 MG/DL (ref 7–25)
BUN/CREAT SERPL: ABNORMAL (CALC) (ref 6–22)
CALCIUM SERPL-MCNC: 9.1 MG/DL (ref 8.6–10.3)
CHLORIDE SERPL-SCNC: 105 MMOL/L (ref 98–110)
CHOLEST SERPL-MCNC: 211 MG/DL
CHOLEST/HDLC SERPL: 3.5 (CALC)
CO2 SERPL-SCNC: 27 MMOL/L (ref 20–32)
CREAT SERPL-MCNC: 1.29 MG/DL (ref 0.7–1.35)
EOSINOPHIL # BLD AUTO: 98 CELLS/UL (ref 15–500)
EOSINOPHIL NFR BLD AUTO: 2 %
ERYTHROCYTE [DISTWIDTH] IN BLOOD BY AUTOMATED COUNT: 14.7 % (ref 11–15)
GFR/BSA.PRED SERPLBLD CYS-BASED-ARV: 63 ML/MIN/1.73M2
GLOBULIN SER CALC-MCNC: 2.8 G/DL (CALC) (ref 1.9–3.7)
GLUCOSE SERPL-MCNC: 100 MG/DL (ref 65–99)
HBA1C MFR BLD: 5.7 % OF TOTAL HGB
HCT VFR BLD AUTO: 43.7 % (ref 38.5–50)
HDLC SERPL-MCNC: 61 MG/DL
HGB BLD-MCNC: 15.2 G/DL (ref 13.2–17.1)
LDLC SERPL CALC-MCNC: 125 MG/DL (CALC)
LYMPHOCYTES # BLD AUTO: 1338 CELLS/UL (ref 850–3900)
LYMPHOCYTES NFR BLD AUTO: 27.3 %
MCH RBC QN AUTO: 27.7 PG (ref 27–33)
MCHC RBC AUTO-ENTMCNC: 34.8 G/DL (ref 32–36)
MCV RBC AUTO: 79.7 FL (ref 80–100)
MONOCYTES # BLD AUTO: 470 CELLS/UL (ref 200–950)
MONOCYTES NFR BLD AUTO: 9.6 %
NEUTROPHILS # BLD AUTO: 2984 CELLS/UL (ref 1500–7800)
NEUTROPHILS NFR BLD AUTO: 60.9 %
NONHDLC SERPL-MCNC: 150 MG/DL (CALC)
PLATELET # BLD AUTO: 177 THOUSAND/UL (ref 140–400)
PMV BLD REES-ECKER: 10.4 FL (ref 7.5–12.5)
POTASSIUM SERPL-SCNC: 4.7 MMOL/L (ref 3.5–5.3)
PROT SERPL-MCNC: 7.3 G/DL (ref 6.1–8.1)
PSA FREE MFR SERPL: 18 % (CALC)
PSA FREE SERPL-MCNC: 0.4 NG/ML
PSA SERPL-MCNC: 2.2 NG/ML
RBC # BLD AUTO: 5.48 MILLION/UL (ref 4.2–5.8)
SODIUM SERPL-SCNC: 141 MMOL/L (ref 135–146)
TRIGL SERPL-MCNC: 134 MG/DL
WBC # BLD AUTO: 4.9 THOUSAND/UL (ref 3.8–10.8)

## 2022-11-09 ENCOUNTER — OFFICE VISIT (OUTPATIENT)
Dept: INTERNAL MEDICINE CLINIC | Facility: CLINIC | Age: 62
End: 2022-11-09

## 2022-11-09 VITALS
WEIGHT: 224.5 LBS | RESPIRATION RATE: 16 BRPM | TEMPERATURE: 96.7 F | SYSTOLIC BLOOD PRESSURE: 118 MMHG | HEART RATE: 86 BPM | HEIGHT: 73 IN | OXYGEN SATURATION: 97 % | DIASTOLIC BLOOD PRESSURE: 80 MMHG | BODY MASS INDEX: 29.75 KG/M2

## 2022-11-09 DIAGNOSIS — C64.1 RENAL CELL CARCINOMA OF RIGHT KIDNEY (HCC): ICD-10-CM

## 2022-11-09 DIAGNOSIS — K22.719 BARRETT'S ESOPHAGUS WITH DYSPLASIA: ICD-10-CM

## 2022-11-09 DIAGNOSIS — R73.03 PREDIABETES: ICD-10-CM

## 2022-11-09 DIAGNOSIS — I10 ESSENTIAL HYPERTENSION: Primary | ICD-10-CM

## 2022-11-09 DIAGNOSIS — R31.29 OTHER MICROSCOPIC HEMATURIA: ICD-10-CM

## 2022-11-09 DIAGNOSIS — Z80.0 FAMILY HISTORY OF PANCREATIC CANCER: ICD-10-CM

## 2022-11-09 DIAGNOSIS — E78.2 MIXED HYPERLIPIDEMIA: ICD-10-CM

## 2022-11-09 NOTE — PROGRESS NOTES
Assessment/Plan:      Criss Flowers is here for follow up; got back from vacation from [place]; his HTN is controlled on amlodipine; will be seeing cardiology soon, urology for h/o RCC s/p nephrectomy, and GI for rebolledo's esophagus  Labs reviewed; discussed starting a statin; ASCVD risk of 8 7%  he would like to see cardiology  Does experience some chest pain  Also following with Dr Leona Cuello from surgical oncology for pancreatic cyst     Quality Measures:     BMI Counseling: There is no height or weight on file to calculate BMI  The BMI is above normal  Nutrition recommendations include decreasing portion sizes and encouraging healthy choices of fruits and vegetables  Exercise recommendations include moderate physical activity 150 minutes/week  Rationale for BMI follow-up plan is due to patient being overweight or obese  Depression Screening and Follow-up Plan: Clincally patient does not have depression  No treatment is required  Return in about 6 months (around 5/9/2023)  No problem-specific Assessment & Plan notes found for this encounter  Diagnoses and all orders for this visit:    Essential hypertension  -     CBC and differential; Future  -     Comprehensive metabolic panel; Future  -     TSH, 3rd generation with Free T4 reflex; Future    Mixed hyperlipidemia  -     Lipid Panel with Direct LDL reflex; Future    Prediabetes  -     Hemoglobin A1C; Future    Rebolledo's esophagus with dysplasia    Renal cell carcinoma of right kidney (HCC)    Family history of pancreatic cancer    Other microscopic hematuria  -     PSA, total and free; Future          Subjective:      Patient ID: Lina Rouse is a 65 y o  male  Here for follow up        ALLERGIES:  No Known Allergies    CURRENT MEDICATIONS:    Current Outpatient Medications:   •  albuterol (PROVENTIL HFA,VENTOLIN HFA) 90 mcg/act inhaler, Inhale 2 puffs every 6 (six) hours as needed for wheezing or shortness of breath, Disp: 18 g, Rfl: 3  • amLODIPine (NORVASC) 5 mg tablet, Take 2 tablets (10 mg total) by mouth in the morning , Disp: 90 tablet, Rfl: 5  •  azelastine (ASTELIN) 0 1 % nasal spray, 1 spray into each nostril 2 (two) times a day Use in each nostril as directed, Disp: , Rfl:   •  Blood Pressure Monitoring (Blood Pressure Monitor 3) JEANNINE, USE 2 (TWO) TIMES A DAY, Disp: 1 Device, Rfl: 0  •  budesonide-formoterol (SYMBICORT) 160-4 5 mcg/act inhaler, Inhale 2 puffs 2 (two) times a day Rinse mouth after use , Disp: 10 2 g, Rfl: 3  •  fluticasone (FLONASE) 50 mcg/act nasal spray, 2 sprays into each nostril daily, Disp: , Rfl:   •  loratadine (CLARITIN) 10 mg tablet, Take 1 tablet (10 mg total) by mouth daily, Disp: 30 tablet, Rfl: 2  •  pantoprazole (PROTONIX) 40 mg tablet, Take 1 tablet (40 mg total) by mouth 2 (two) times a day, Disp: 240 tablet, Rfl: 3    ACTIVE PROBLEM LIST:  Patient Active Problem List   Diagnosis   • Gastroesophageal reflux disease without esophagitis   • History of colonic polyps   • Kidney disease   • Adrenal adenoma, right   • Pancreatic cyst   • Family history of pancreatic cancer   • Renal cell carcinoma of right kidney (HCC)   • H/O partial nephrectomy   • Essential hypertension   • Mixed hyperlipidemia   • Brady's esophagus with dysplasia   • Family hx of colon cancer       PAST MEDICAL HISTORY:  Past Medical History:   Diagnosis Date   • Asthma    • Chronic kidney disease    • Colon polyp    • GERD (gastroesophageal reflux disease)    • Renal cancer, right (HCC)        PAST SURGICAL HISTORY:  Past Surgical History:   Procedure Laterality Date   • CHOLECYSTECTOMY     • COLONOSCOPY     • HERNIA REPAIR     • KIDNEY SURGERY     • NISSEN FUNDOPLICATION         FAMILY HISTORY:  Family History   Problem Relation Age of Onset   • Colon cancer Mother 78        metastatic   • Prostate cancer Father 76   • Cancer Maternal Grandmother         bladder cancer   • Cancer Maternal Uncle         bladder cancer   • Pancreatic cancer Half-Brother 79       SOCIAL HISTORY:  Social History     Socioeconomic History   • Marital status: /Civil Union     Spouse name: Not on file   • Number of children: Not on file   • Years of education: Not on file   • Highest education level: Not on file   Occupational History   • Not on file   Tobacco Use   • Smoking status: Never Smoker   • Smokeless tobacco: Never Used   Vaping Use   • Vaping Use: Never used   Substance and Sexual Activity   • Alcohol use: Yes     Comment: social   • Drug use: Never   • Sexual activity: Yes   Other Topics Concern   • Not on file   Social History Narrative   • Not on file     Social Determinants of Health     Financial Resource Strain: Not on file   Food Insecurity: Not on file   Transportation Needs: Not on file   Physical Activity: Not on file   Stress: Not on file   Social Connections: Not on file   Intimate Partner Violence: Not on file   Housing Stability: Not on file       Review of Systems   All other systems reviewed and are negative  Objective:  Vitals:    11/09/22 1014   BP: 118/80   BP Location: Left arm   Patient Position: Sitting   Cuff Size: Adult   Pulse: 86   Resp: 16   Temp: (!) 96 7 °F (35 9 °C)   TempSrc: Tympanic   SpO2: 97%   Weight: 102 kg (224 lb 8 oz)   Height: 6' 1" (1 854 m)     Body mass index is 29 62 kg/m²  Physical Exam  Vitals and nursing note reviewed  Constitutional:       Appearance: Normal appearance  HENT:      Head: Normocephalic and atraumatic  Cardiovascular:      Rate and Rhythm: Normal rate and regular rhythm  Heart sounds: Normal heart sounds  Pulmonary:      Effort: Pulmonary effort is normal       Breath sounds: Normal breath sounds  Abdominal:      Palpations: Abdomen is soft  Musculoskeletal:         General: Normal range of motion  Cervical back: Normal range of motion  Right lower leg: No edema  Left lower leg: No edema  Lymphadenopathy:      Cervical: No cervical adenopathy  Skin:     General: Skin is warm and dry  Neurological:      General: No focal deficit present  Mental Status: He is alert and oriented to person, place, and time  Mental status is at baseline     Psychiatric:         Mood and Affect: Mood normal            RESULTS:    Recent Results (from the past 1008 hour(s))   Lipid Panel with Direct LDL reflex    Collection Time: 11/01/22 10:41 AM   Result Value Ref Range    Total Cholesterol 211 (H) <200 mg/dL    HDL 61 > OR = 40 mg/dL    Triglycerides 134 <150 mg/dL    LDL Calculated 125 (H) mg/dL (calc)    Chol HDLC Ratio 3 5 <5 0 (calc)    Non-HDL Cholesterol 150 (H) <130 mg/dL (calc)   Comprehensive metabolic panel    Collection Time: 11/01/22 10:41 AM   Result Value Ref Range    Glucose, Random 100 (H) 65 - 99 mg/dL    BUN 24 7 - 25 mg/dL    Creatinine 1 29 0 70 - 1 35 mg/dL    eGFR 63 > OR = 60 mL/min/1 73m2    SL AMB BUN/CREATININE RATIO NOT APPLICABLE 6 - 22 (calc)    Sodium 141 135 - 146 mmol/L    Potassium 4 7 3 5 - 5 3 mmol/L    Chloride 105 98 - 110 mmol/L    CO2 27 20 - 32 mmol/L    Calcium 9 1 8 6 - 10 3 mg/dL    Protein, Total 7 3 6 1 - 8 1 g/dL    Albumin 4 5 3 6 - 5 1 g/dL    Globulin 2 8 1 9 - 3 7 g/dL (calc)    Albumin/Globulin Ratio 1 6 1 0 - 2 5 (calc)    TOTAL BILIRUBIN 0 3 0 2 - 1 2 mg/dL    Alkaline Phosphatase 78 35 - 144 U/L    AST 19 10 - 35 U/L    ALT 16 9 - 46 U/L   CBC and differential    Collection Time: 11/01/22 10:41 AM   Result Value Ref Range    White Blood Cell Count 4 9 3 8 - 10 8 Thousand/uL    Red Blood Cell Count 5 48 4 20 - 5 80 Million/uL    Hemoglobin 15 2 13 2 - 17 1 g/dL    HCT 43 7 38 5 - 50 0 %    MCV 79 7 (L) 80 0 - 100 0 fL    MCH 27 7 27 0 - 33 0 pg    MCHC 34 8 32 0 - 36 0 g/dL    RDW 14 7 11 0 - 15 0 %    Platelet Count 330 566 - 400 Thousand/uL    SL AMB MPV 10 4 7 5 - 12 5 fL    Neutrophils (Absolute) 2,984 1,500 - 7,800 cells/uL    Lymphocytes (Absolute) 1,338 850 - 3,900 cells/uL    Monocytes (Absolute) 470 200 - 950 cells/uL    Eosinophils (Absolute) 98 15 - 500 cells/uL    Basophils ABS 10 0 - 200 cells/uL    Neutrophils 60 9 %    Lymphocytes 27 3 %    Monocytes 9 6 %    Eosinophils 2 0 %    Basophils PCT 0 2 %   PSA, total and free    Collection Time: 11/01/22 10:41 AM   Result Value Ref Range    Prostate Specific Antigen Total 2 2 < OR = 4 0 ng/mL    PSA, Free 0 4 ng/mL    PSA, Free Pct 18 (L) >25 % (calc)   Hemoglobin A1c (w/out EAG)    Collection Time: 11/01/22 10:41 AM   Result Value Ref Range    Hemoglobin A1C 5 7 (H) <5 7 % of total Hgb       This note was created with voice recognition software  Phonic, grammatical and spelling errors may be present within the note as a result

## 2023-01-11 DIAGNOSIS — I10 ESSENTIAL HYPERTENSION: ICD-10-CM

## 2023-01-11 RX ORDER — AMLODIPINE BESYLATE 5 MG/1
10 TABLET ORAL DAILY
Qty: 90 TABLET | Refills: 1 | Status: SHIPPED | OUTPATIENT
Start: 2023-01-11 | End: 2023-01-17 | Stop reason: SDUPTHER

## 2023-01-17 DIAGNOSIS — I10 ESSENTIAL HYPERTENSION: ICD-10-CM

## 2023-01-17 RX ORDER — AMLODIPINE BESYLATE 5 MG/1
10 TABLET ORAL DAILY
Qty: 180 TABLET | Refills: 1 | Status: SHIPPED | OUTPATIENT
Start: 2023-01-17

## 2023-03-20 NOTE — TELEPHONE ENCOUNTER
Genetics New Patient Intake Form     Patient Details: Lauren Kemp     1960     [id number]     Background Information:           Who is calling to schedule? PROVIDER   If not self, relationship to patient? Dr Roger Mena   Referring Provider Dr Roger Mena   Is the referral marked STAT No   Is patient newly diagnosed with cancer, have metastatic disease, or pending surgery? No   If yes, which is it? If the patient is pending surgery Needs to be scheduled within 48 hours   If none available, schedule patient then email Stat cancer genetics   If the patient is metastatic or newly diagnosed Needs to be scheduled within 2 weeks   If none available, schedule patient then email [facility] cancer genetics   Have you had genetic testing that showed a positive genetic mutation? (If yes, schedule within 2 weeks or email STAT cancer genetics) No   Has your family member had genetic testing that resulted in a positive genetic mutation? (If yes in the last 6 months, schedule within 3 weeks )  (If yes but over 6 months, schedule as usual) No   Is this a personal or family history?  family  brother   What is the type of tumor? Pancreatic cancer   Scheduling Information:   Preferred [place]: [facility]   Are there any dates/time the patient cannot be seen? No   Did the patient schedule an appointment?  Yes   If yes, list appointment date and provider name 5/26/21  Rajeev Saab   If no, briefly state why    Miscellaneous    After completing the above information, please route to Oncology Genetics
[phone]

## 2023-05-12 ENCOUNTER — TELEPHONE (OUTPATIENT)
Dept: SURGICAL ONCOLOGY | Facility: CLINIC | Age: 63
End: 2023-05-12

## 2023-05-12 NOTE — TELEPHONE ENCOUNTER
----- Message from Barney Irizarry RN sent at 5/12/2023  9:39 AM EDT -----  Patient due for MRI and follow up with [facility]   Please schedule

## 2023-05-12 NOTE — TELEPHONE ENCOUNTER
Called central scheduling and attempted scheduling MRI however they reported that patient was previously scheduled and cancelled due to insurance issues  Attempted calling patient, no answer and mailbox is not set up yet  Called and left message for patient's wife asking her to call back to confirm if patient is still experiencing insurance issues and if possible to schedule MRI  Provided hope line number to call back

## 2023-07-13 DIAGNOSIS — K21.9 GASTROESOPHAGEAL REFLUX DISEASE WITHOUT ESOPHAGITIS: ICD-10-CM

## 2023-07-13 RX ORDER — PANTOPRAZOLE SODIUM 40 MG/1
TABLET, DELAYED RELEASE ORAL
Qty: 180 TABLET | OUTPATIENT
Start: 2023-07-13

## 2023-07-13 RX ORDER — PANTOPRAZOLE SODIUM 40 MG/1
TABLET, DELAYED RELEASE ORAL
Qty: 60 TABLET | Refills: 0 | Status: SHIPPED | OUTPATIENT
Start: 2023-07-13

## 2023-08-06 DIAGNOSIS — K21.9 GASTROESOPHAGEAL REFLUX DISEASE WITHOUT ESOPHAGITIS: ICD-10-CM

## 2023-08-07 DIAGNOSIS — K21.9 GASTROESOPHAGEAL REFLUX DISEASE WITHOUT ESOPHAGITIS: ICD-10-CM

## 2023-08-07 RX ORDER — PANTOPRAZOLE SODIUM 40 MG/1
TABLET, DELAYED RELEASE ORAL
Qty: 60 TABLET | Refills: 0 | Status: SHIPPED | OUTPATIENT
Start: 2023-08-07

## 2023-08-07 RX ORDER — PANTOPRAZOLE SODIUM 40 MG/1
TABLET, DELAYED RELEASE ORAL
Qty: 180 TABLET | OUTPATIENT
Start: 2023-08-07

## 2023-09-05 DIAGNOSIS — K21.9 GASTROESOPHAGEAL REFLUX DISEASE WITHOUT ESOPHAGITIS: ICD-10-CM

## 2023-09-05 RX ORDER — PANTOPRAZOLE SODIUM 40 MG/1
TABLET, DELAYED RELEASE ORAL
Qty: 60 TABLET | Refills: 0 | Status: SHIPPED | OUTPATIENT
Start: 2023-09-05

## 2023-10-09 ENCOUNTER — TELEPHONE (OUTPATIENT)
Dept: GASTROENTEROLOGY | Facility: CLINIC | Age: 63
End: 2023-10-09

## 2023-10-09 ENCOUNTER — TELEPHONE (OUTPATIENT)
Dept: NEPHROLOGY | Facility: CLINIC | Age: 63
End: 2023-10-09

## 2023-10-09 ENCOUNTER — OFFICE VISIT (OUTPATIENT)
Dept: GASTROENTEROLOGY | Facility: CLINIC | Age: 63
End: 2023-10-09
Payer: COMMERCIAL

## 2023-10-09 VITALS
HEART RATE: 85 BPM | DIASTOLIC BLOOD PRESSURE: 84 MMHG | HEIGHT: 73 IN | WEIGHT: 218.6 LBS | SYSTOLIC BLOOD PRESSURE: 124 MMHG | BODY MASS INDEX: 28.97 KG/M2

## 2023-10-09 DIAGNOSIS — K86.2 PANCREATIC CYST: ICD-10-CM

## 2023-10-09 DIAGNOSIS — K22.719 BARRETT'S ESOPHAGUS WITH DYSPLASIA: ICD-10-CM

## 2023-10-09 DIAGNOSIS — K59.00 CONSTIPATION, UNSPECIFIED CONSTIPATION TYPE: ICD-10-CM

## 2023-10-09 DIAGNOSIS — K21.9 GASTROESOPHAGEAL REFLUX DISEASE WITHOUT ESOPHAGITIS: ICD-10-CM

## 2023-10-09 DIAGNOSIS — Z86.010 HISTORY OF COLON POLYPS: Primary | ICD-10-CM

## 2023-10-09 PROCEDURE — 99214 OFFICE O/P EST MOD 30 MIN: CPT | Performed by: INTERNAL MEDICINE

## 2023-10-09 RX ORDER — METHYLCELLULOSE 500 MG/1
1000 TABLET ORAL DAILY
Qty: 60 TABLET | Refills: 3 | Status: SHIPPED | OUTPATIENT
Start: 2023-10-09

## 2023-10-09 RX ORDER — PANTOPRAZOLE SODIUM 40 MG/1
40 TABLET, DELAYED RELEASE ORAL 2 TIMES DAILY
Qty: 180 TABLET | Refills: 2 | Status: SHIPPED | OUTPATIENT
Start: 2023-10-09

## 2023-10-09 NOTE — PROGRESS NOTES
[facility] - Outpatient Note  Rodolfo Akers 59 y.o. male MRN: [id number]  Encounter: [id number]      ASSESSMENT AND PLAN:    Rodolfo Akers is a 59 y.o. old pleasant male with PMH of pancreatic cyst, GERD, Brady's with indefinite dysplasia, constipation who presents for followup     GERD, Brady's with indefinite dysplasia, hiatal hernia-last EGD last year showed C013 Brady's with indefinite dysplasia. Patient is currently on Protonix 40 mg daily with good control of his PPI. He also had MRI moderate hiatal hernia. · Increase Protonix to 40 mg twice daily  · Plan for EGD in 3 months to evaluate for Brady's and dysplasia. We will also biopsy for EOE and consider dilation  · We will also evaluate    Dysphagia-intermittent solid food dysphagia for multiple years. · Plan for EGD with biopsies for EOE and possible dilation    Constipation-patient reports bowel every 2 to 3 days with significant straining which he attributes to PPI. Not on any bowel regimen. · Start Citrucel daily  · Consult on fluid, fiber, ambulation  · Patient instructed to add MiraLAX daily if Citrucel does not work    Pancreatic cysts-MRI in 2022 shows unchanged 18 x 11 mm pancreatic cyst in the neck of the pancreas. Repeat recommended 1 year. He has not got this done because he states his insurance does not accept [facility]. He states [facility] will cover this and his PCP can order this. · I ordered MRI in case his insurance will cover it however if not I told him to have his PCP order this    History of colon polyp-patient colonoscopy in 2020 with large 15 mm tubulovillous adenoma removed in piecemeal fashion with repeat in 3 months afterwards. He is recommended repeat in 3 years  · Plan for colonoscopy    1. History of colon polyps  - Colonoscopy; Future    2. Gastroesophageal reflux disease without esophagitis  - EGD; Future    3. Brady's esophagus with dysplasia    - EGD; Future    4.  Constipation, unspecified constipation type  - methylcellulose (Citrucel) 500 mg tablet; Take 2 tablets (1,000 mg total) by mouth daily  Dispense: 60 tablet; Refill: 3    5. Pancreatic cyst    - MRI abdomen w wo contrast and mrcp; Future    ______________________________________________________________________    SUBJECTIVE:  Cecily Ortiz is a 59 y.o. old pleasant male with PMH of pancreatic cyst, GERD, Brady's with indefinite dysplasia, constipation who presents for followup. Patient overall doing well with no acute complaints. He states main reason he is here is for his pantoprazole refill. He has been on 40 mg daily with good control of his symptoms. He has symptoms of heartburn less than once per week. He otherwise denies nausea vomiting constipation diarrhea. I reviewed prior external notes    I reviewed previous lab results and images      REVIEW OF SYSTEMS:     REVIEW OF ALL OTHER SYSTEMS IS OTHERWISE NEGATIVE.       Historical Information   Past Medical History:   Diagnosis Date   • Asthma    • Chronic kidney disease    • Colon polyp    • GERD (gastroesophageal reflux disease)    • Renal cancer, right (HCC)      Past Surgical History:   Procedure Laterality Date   • CHOLECYSTECTOMY     • COLONOSCOPY     • HERNIA REPAIR     • KIDNEY SURGERY     • NISSEN FUNDOPLICATION       Social History   Social History     Substance and Sexual Activity   Alcohol Use Yes    Comment: social     Social History     Substance and Sexual Activity   Drug Use Never     Social History     Tobacco Use   Smoking Status Never   Smokeless Tobacco Never     Family History   Problem Relation Age of Onset   • Colon cancer Mother 78        metastatic   • Prostate cancer Father 76   • Cancer Maternal Grandmother         bladder cancer   • Cancer Maternal Uncle         bladder cancer   • Pancreatic cancer Half-Brother 79       Meds/Allergies       Current Outpatient Medications:   •  albuterol (PROVENTIL HFA,VENTOLIN HFA) 90 mcg/act inhaler  •  amLODIPine (NORVASC) 5 mg tablet  •  aspirin (ECOTRIN LOW STRENGTH) 81 mg EC tablet  •  azelastine (ASTELIN) 0.1 % nasal spray  •  Blood Pressure Monitoring (Blood Pressure Monitor 3) JEANNINE  •  budesonide-formoterol (SYMBICORT) 160-4.5 mcg/act inhaler  •  fluticasone (FLONASE) 50 mcg/act nasal spray  •  methylcellulose (Citrucel) 500 mg tablet  •  pantoprazole (PROTONIX) 40 mg tablet  •  rosuvastatin (CRESTOR) 20 MG tablet  •  loratadine (CLARITIN) 10 mg tablet    Allergies   Allergen Reactions   • Bee Venom Other (See Comments)   • Dust Mite Extract Other (See Comments)   • Pollen Extract Other (See Comments)           Objective     Blood pressure 124/84, pulse 85, height 6' 1" (1.854 m), weight 99.2 kg (218 lb 9.6 oz). Body mass index is 28.84 kg/m². PHYSICAL EXAM:      General Appearance:   Alert, cooperative, no distress   HEENT:   Normocephalic, atraumatic, anicteric. Neck:  Supple, symmetrical, trachea midline   Lungs:   Clear to auscultation bilaterally; no rales, rhonchi or wheezing; respirations unlabored    Heart[de-identified]   Regular rate and rhythm; no murmur, rub, or gallop. Abdomen:   Soft, non-tender, non-distended; normal bowel sounds; no masses, no organomegaly    Genitalia:   Deferred    Rectal:   Deferred    Extremities:  No cyanosis, clubbing or edema    Pulses:  2+ and symmetric    Skin:  No jaundice, rashes, or lesions    Lymph nodes:  No palpable cervical lymphadenopathy        Lab Results:   No visits with results within 1 Day(s) from this visit.    Latest known visit with results is:   Orders Only on 11/01/2022   Component Date Value   • Total Cholesterol 11/01/2022 211 (H)    • HDL 11/01/2022 61    • Triglycerides 11/01/2022 134    • LDL Calculated 11/01/2022 125 (H)    • Chol HDLC Ratio 11/01/2022 3.5    • Non-HDL Cholesterol 11/01/2022 150 (H)    • Glucose, Random 11/01/2022 100 (H)    • BUN 11/01/2022 24    • Creatinine 11/01/2022 1.29    • eGFR 11/01/2022 63    • SL AMB BUN/CREATININE RA* 22/48/2662 NOT APPLICABLE    • Sodium 57/71/5875 141    • Potassium 11/01/2022 4.7    • Chloride 11/01/2022 105    • CO2 11/01/2022 27    • Calcium 11/01/2022 9.1    • Protein, Total 11/01/2022 7.3    • Albumin 11/01/2022 4.5    • Globulin 11/01/2022 2.8    • Albumin/Globulin Ratio 11/01/2022 1.6    • TOTAL BILIRUBIN 11/01/2022 0.3    • Alkaline Phosphatase 11/01/2022 78    • AST 11/01/2022 19    • ALT 11/01/2022 16    • White Blood Cell Count 11/01/2022 4.9    • Red Blood Cell Count 11/01/2022 5.48    • Hemoglobin 11/01/2022 15.2    • HCT 11/01/2022 43.7    • MCV 11/01/2022 79.7 (L)    • MCH 11/01/2022 27.7    • MCHC 11/01/2022 34.8    • RDW 11/01/2022 14.7    • Platelet Count 35/63/4580 177    • SL AMB MPV 11/01/2022 10.4    • Neutrophils (Absolute) 11/01/2022 2,984    • Lymphocytes (Absolute) 11/01/2022 1,338    • Monocytes (Absolute) 11/01/2022 470    • Eosinophils (Absolute) 11/01/2022 98    • Basophils ABS 11/01/2022 10    • Neutrophils 11/01/2022 60.9    • Lymphocytes 11/01/2022 27.3    • Monocytes 11/01/2022 9.6    • Eosinophils 11/01/2022 2.0    • Basophils PCT 11/01/2022 0.2    • Prostate Specific Antige* 11/01/2022 2.2    • PSA, Free 11/01/2022 0.4    • PSA, Free Pct 11/01/2022 18 (L)    • Hemoglobin A1C 11/01/2022 5.7 (H)          Radiology Results:   No results found.

## 2023-10-09 NOTE — PATIENT INSTRUCTIONS
Scheduled date of EGD/colonoscopy (as of today):1/3/24  Physician performing EGD/colonoscopy:Kierra  Location of EGD/colonoscopy:[place]  Desired bowel prep reviewed with patient:Jigna/Miralax  Instructions reviewed with patient by:Stanton burt  Clearances:   none

## 2023-10-09 NOTE — TELEPHONE ENCOUNTER
Spoke to [place] at [facility], patient has active coverage since 9/29/2011, secondary payor, they will  what primary insurance does not pay, explained we are non-par w/[employer], she said they will need eob from [facility] for payment

## 2023-11-21 ENCOUNTER — TELEPHONE (OUTPATIENT)
Age: 63
End: 2023-11-21

## 2023-11-21 NOTE — TELEPHONE ENCOUNTER
Patients GI provider:  Dr. Shreya Herrmann    Number to return call: [phone]    Reason for call: Pt called in stating patient received a letter in the mail regarding MRI of the abdomen. Patient states the MRI is not coved by his insurance and will need to pay over $2000.00. Patient requesting a call back from the doctor to further discuss. Please reach out to patient at the number above, thank you.     Scheduled procedure/appointment date if applicable: Apt/procedure 01/03/2024

## 2023-11-21 NOTE — TELEPHONE ENCOUNTER
Called and spoke it pt. He stated he received  a letter from his insurance that he will need to pay $2000. In the referral it looks like Rodney Mayorga tried to call and left a message for pt. But pt states he has not received anything. Please reach out to him to discuss the auth. Thank you.

## 2023-12-20 ENCOUNTER — TELEPHONE (OUTPATIENT)
Age: 63
End: 2023-12-20

## 2023-12-20 NOTE — TELEPHONE ENCOUNTER
Patients GI provider:  Dr. Lozada    Number to return call: [phone]    Reason for call: Amanda villarreal Carelon called in stating that both the EGD & Colon were approved. Auth number is [id number].    Scheduled procedure/appointment date if applicable: procedure 1/3/24

## 2023-12-28 ENCOUNTER — TELEPHONE (OUTPATIENT)
Age: 63
End: 2023-12-28

## 2023-12-28 NOTE — TELEPHONE ENCOUNTER
Spoke with Robbie mendez colonoscpy EGD for 01/03/24. Patient did not have any questions. Reminded patient that the hospital will call the day before with arrival time. Patient is on all liquid diet all day Tuesday, nothing red, purple or orange to drink. No dairy and no solid food. Please call with any questions.

## 2024-01-03 ENCOUNTER — ANESTHESIA EVENT (OUTPATIENT)
Dept: GASTROENTEROLOGY | Facility: HOSPITAL | Age: 64
End: 2024-01-03

## 2024-01-03 ENCOUNTER — HOSPITAL ENCOUNTER (OUTPATIENT)
Dept: GASTROENTEROLOGY | Facility: HOSPITAL | Age: 64
Setting detail: OUTPATIENT SURGERY
Discharge: HOME | End: 2024-01-03
Attending: INTERNAL MEDICINE | Admitting: INTERNAL MEDICINE
Payer: COMMERCIAL

## 2024-01-03 ENCOUNTER — ANESTHESIA (OUTPATIENT)
Dept: GASTROENTEROLOGY | Facility: HOSPITAL | Age: 64
End: 2024-01-03

## 2024-01-03 VITALS
WEIGHT: 211.2 LBS | DIASTOLIC BLOOD PRESSURE: 78 MMHG | OXYGEN SATURATION: 98 % | SYSTOLIC BLOOD PRESSURE: 123 MMHG | HEART RATE: 60 BPM | RESPIRATION RATE: 16 BRPM | TEMPERATURE: 97.5 F | BODY MASS INDEX: 27.99 KG/M2 | HEIGHT: 73 IN

## 2024-01-03 DIAGNOSIS — K21.9 GASTROESOPHAGEAL REFLUX DISEASE WITHOUT ESOPHAGITIS: ICD-10-CM

## 2024-01-03 DIAGNOSIS — K22.719 BARRETT'S ESOPHAGUS WITH DYSPLASIA: ICD-10-CM

## 2024-01-03 DIAGNOSIS — Z86.010 HISTORY OF COLON POLYPS: ICD-10-CM

## 2024-01-03 PROBLEM — J45.909 ASTHMA: Status: ACTIVE | Noted: 2024-01-03

## 2024-01-03 PROCEDURE — 88305 TISSUE EXAM BY PATHOLOGIST: CPT | Performed by: SPECIALIST

## 2024-01-03 PROCEDURE — 43239 EGD BIOPSY SINGLE/MULTIPLE: CPT | Performed by: INTERNAL MEDICINE

## 2024-01-03 PROCEDURE — 43248 EGD GUIDE WIRE INSERTION: CPT | Performed by: INTERNAL MEDICINE

## 2024-01-03 RX ORDER — SODIUM CHLORIDE, SODIUM LACTATE, POTASSIUM CHLORIDE, CALCIUM CHLORIDE 600; 310; 30; 20 MG/100ML; MG/100ML; MG/100ML; MG/100ML
125 INJECTION, SOLUTION INTRAVENOUS CONTINUOUS
Status: DISCONTINUED | OUTPATIENT
Start: 2024-01-03 | End: 2024-01-07 | Stop reason: HOSPADM

## 2024-01-03 RX ORDER — PROPOFOL 10 MG/ML
INJECTION, EMULSION INTRAVENOUS AS NEEDED
Status: DISCONTINUED | OUTPATIENT
Start: 2024-01-03 | End: 2024-01-03

## 2024-01-03 RX ORDER — LIDOCAINE HCL/PF 100 MG/5ML
SYRINGE (ML) INJECTION AS NEEDED
Status: DISCONTINUED | OUTPATIENT
Start: 2024-01-03 | End: 2024-01-03

## 2024-01-03 RX ADMIN — PROPOFOL 50 MG: 10 INJECTION, EMULSION INTRAVENOUS at 08:05

## 2024-01-03 RX ADMIN — PROPOFOL 50 MG: 10 INJECTION, EMULSION INTRAVENOUS at 07:53

## 2024-01-03 RX ADMIN — PROPOFOL 50 MG: 10 INJECTION, EMULSION INTRAVENOUS at 07:51

## 2024-01-03 RX ADMIN — LIDOCAINE HYDROCHLORIDE 100 MG: 20 INJECTION INTRAVENOUS at 07:48

## 2024-01-03 RX ADMIN — PROPOFOL 150 MG: 10 INJECTION, EMULSION INTRAVENOUS at 07:49

## 2024-01-03 RX ADMIN — PROPOFOL 50 MG: 10 INJECTION, EMULSION INTRAVENOUS at 07:57

## 2024-01-03 RX ADMIN — SODIUM CHLORIDE, SODIUM LACTATE, POTASSIUM CHLORIDE, AND CALCIUM CHLORIDE: .6; .31; .03; .02 INJECTION, SOLUTION INTRAVENOUS at 07:25

## 2024-01-03 RX ADMIN — PROPOFOL 50 MG: 10 INJECTION, EMULSION INTRAVENOUS at 08:01

## 2024-01-03 RX ADMIN — PROPOFOL 50 MG: 10 INJECTION, EMULSION INTRAVENOUS at 07:55

## 2024-01-03 NOTE — H&P
History and Physical - [facility]  Robbie Melchor 63 y.o. male MRN: [id number]                  HPI: Robbie Melchor is a 63 y.o. year old male who presents for EGD/colonoscopy for dysphagia, Brady's and history of colon polyps      REVIEW OF SYSTEMS: Per the HPI, and otherwise unremarkable.    Historical Information   Past Medical History:   Diagnosis Date    Asthma     Chronic kidney disease     Colon polyp     GERD (gastroesophageal reflux disease)     Renal cancer, right (HCC)      Past Surgical History:   Procedure Laterality Date    CHOLECYSTECTOMY      COLONOSCOPY      HERNIA REPAIR      KIDNEY SURGERY      NISSEN FUNDOPLICATION       Social History   Social History     Substance and Sexual Activity   Alcohol Use Yes    Comment: social     Social History     Substance and Sexual Activity   Drug Use Never     Social History     Tobacco Use   Smoking Status Never   Smokeless Tobacco Never     Family History   Problem Relation Age of Onset    Colon cancer Mother 79        metastatic    Prostate cancer Father 75    Cancer Maternal Grandmother         bladder cancer    Cancer Maternal Uncle         bladder cancer    Pancreatic cancer Half-Brother 70       Meds/Allergies       Current Outpatient Medications:     albuterol (PROVENTIL HFA,VENTOLIN HFA) 90 mcg/act inhaler    amLODIPine (NORVASC) 5 mg tablet    aspirin (ECOTRIN LOW STRENGTH) 81 mg EC tablet    azelastine (ASTELIN) 0.1 % nasal spray    Blood Pressure Monitoring (Blood Pressure Monitor 3) JEANNINE    budesonide-formoterol (SYMBICORT) 160-4.5 mcg/act inhaler    fluticasone (FLONASE) 50 mcg/act nasal spray    loratadine (CLARITIN) 10 mg tablet    methylcellulose (Citrucel) 500 mg tablet    pantoprazole (PROTONIX) 40 mg tablet    rosuvastatin (CRESTOR) 20 MG tablet    Current Facility-Administered Medications:     lactated ringers infusion, 125 mL/hr, Intravenous, Continuous    Allergies   Allergen Reactions    Bee Venom Other (See  "Comments)    Dust Mite Extract Other (See Comments)    Pollen Extract Other (See Comments)       Objective     /88   Pulse 67   Temp (!) 97.2 °F (36.2 °C) (Temporal)   Resp 16   Ht 6' 1\" (1.854 m)   Wt 95.8 kg (211 lb 3.2 oz)   SpO2 99%   BMI 27.86 kg/m²       PHYSICAL EXAM    Gen: NAD  Head: NCAT  CV: RRR  CHEST: Clear  ABD: soft, NT/ND  EXT: no edema      ASSESSMENT/PLAN:  Robbie Melchor is a 63 y.o. year old male who presents for EGD/colonoscopy for dysphagia, Brady's and history of colon polyps. The patient is stable and optimized for the procedure, we reviewed risk and benefits. Risk include but not limited to infection, bleeding, perforation and missing a lesion.          "

## 2024-01-03 NOTE — ANESTHESIA POSTPROCEDURE EVALUATION
Post-Op Assessment Note    CV Status:  Stable  Pain Score: 0    Pain management: adequate       Mental Status:  Sleepy and arousable   Hydration Status:  Stable   PONV Controlled:  Controlled   Airway Patency:  Patent     Post Op Vitals Reviewed: Yes      Staff: CRNA               BP   115/73   Temp      Pulse  64   Resp   12   SpO2   96

## 2024-01-03 NOTE — ANESTHESIA PREPROCEDURE EVALUATION
Procedure:  COLONOSCOPY  EGD    Relevant Problems   CARDIO   (+) Essential hypertension   (+) Mixed hyperlipidemia      GI/HEPATIC   (+) Gastroesophageal reflux disease without esophagitis   (+) Pancreatic cyst      /RENAL   (+) Renal cell carcinoma of right kidney (HCC)      PULMONARY   (+) Asthma      Digestive   (+) Brady's esophagus with dysplasia      Other   (+) H/O partial nephrectomy        Physical Exam    Airway    Mallampati score: II  TM Distance: >3 FB  Neck ROM: full     Dental   Comment: Denies loose teeth     Cardiovascular  Cardiovascular exam normal    Pulmonary  Pulmonary exam normal     Other Findings  Portions of exam deferred due to low yield and/or unknown COVID status      Anesthesia Plan  ASA Score- 2     Anesthesia Type- IV sedation with anesthesia with ASA Monitors.         Additional Monitors:     Airway Plan:            Plan Factors-Exercise tolerance (METS): >4 METS.    Chart reviewed.   Existing labs reviewed. Patient summary reviewed.    Patient is not a current smoker.              Induction- intravenous.    Postoperative Plan-     Informed Consent- Anesthetic plan and risks discussed with patient.  I personally reviewed this patient with the CRNA. Discussed and agreed on the Anesthesia Plan with the CRNA..

## 2024-01-05 PROCEDURE — 88305 TISSUE EXAM BY PATHOLOGIST: CPT | Performed by: SPECIALIST

## 2024-04-08 ENCOUNTER — TELEPHONE (OUTPATIENT)
Dept: INTERNAL MEDICINE CLINIC | Facility: CLINIC | Age: 64
End: 2024-04-08

## 2024-04-24 NOTE — TELEPHONE ENCOUNTER
04/24/24 10:49 AM        The office's request has been received, reviewed, and the patient chart updated. The PCP has successfully been removed with a patient attribution note. This message will now be completed.        Thank you  Galilea Camacho